# Patient Record
Sex: FEMALE | Race: WHITE | NOT HISPANIC OR LATINO | Employment: UNEMPLOYED | URBAN - METROPOLITAN AREA
[De-identification: names, ages, dates, MRNs, and addresses within clinical notes are randomized per-mention and may not be internally consistent; named-entity substitution may affect disease eponyms.]

---

## 2017-05-16 ENCOUNTER — CONVERSION ENCOUNTER (OUTPATIENT)
Dept: GENERAL RADIOLOGY | Facility: HOSPITAL | Age: 52
End: 2017-05-16

## 2017-05-24 ENCOUNTER — CONVERSION ENCOUNTER (OUTPATIENT)
Dept: GENERAL RADIOLOGY | Facility: HOSPITAL | Age: 52
End: 2017-05-24

## 2018-08-28 ENCOUNTER — CONVERSION ENCOUNTER (OUTPATIENT)
Dept: GENERAL RADIOLOGY | Facility: HOSPITAL | Age: 53
End: 2018-08-28

## 2019-08-30 ENCOUNTER — HOSPITAL ENCOUNTER (OUTPATIENT)
Dept: GENERAL RADIOLOGY | Facility: HOSPITAL | Age: 54
Discharge: HOME | End: 2019-08-30
Attending: OBSTETRICS & GYNECOLOGY

## 2019-12-26 ENCOUNTER — OFFICE VISIT CONVERTED (OUTPATIENT)
Dept: ORTHOPEDIC SURGERY | Facility: CLINIC | Age: 54
End: 2019-12-26
Attending: PHYSICIAN ASSISTANT

## 2020-01-16 ENCOUNTER — OFFICE VISIT CONVERTED (OUTPATIENT)
Dept: ORTHOPEDIC SURGERY | Facility: CLINIC | Age: 55
End: 2020-01-16
Attending: PHYSICIAN ASSISTANT

## 2020-01-16 ENCOUNTER — CONVERSION ENCOUNTER (OUTPATIENT)
Dept: ORTHOPEDIC SURGERY | Facility: CLINIC | Age: 55
End: 2020-01-16

## 2020-02-06 ENCOUNTER — CONVERSION ENCOUNTER (OUTPATIENT)
Dept: ORTHOPEDIC SURGERY | Facility: CLINIC | Age: 55
End: 2020-02-06

## 2020-02-06 ENCOUNTER — OFFICE VISIT CONVERTED (OUTPATIENT)
Dept: ORTHOPEDIC SURGERY | Facility: CLINIC | Age: 55
End: 2020-02-06
Attending: PHYSICIAN ASSISTANT

## 2020-02-12 ENCOUNTER — HOSPITAL ENCOUNTER (OUTPATIENT)
Dept: PHYSICAL THERAPY | Facility: CLINIC | Age: 55
Setting detail: RECURRING SERIES
Discharge: HOME | End: 2020-03-30
Attending: ORTHOPAEDIC SURGERY

## 2020-02-27 ENCOUNTER — OFFICE VISIT CONVERTED (OUTPATIENT)
Dept: ORTHOPEDIC SURGERY | Facility: CLINIC | Age: 55
End: 2020-02-27
Attending: PHYSICIAN ASSISTANT

## 2020-02-27 ENCOUNTER — CONVERSION ENCOUNTER (OUTPATIENT)
Dept: ORTHOPEDIC SURGERY | Facility: CLINIC | Age: 55
End: 2020-02-27

## 2020-05-08 ENCOUNTER — HOSPITAL ENCOUNTER (OUTPATIENT)
Dept: LAB | Facility: HOSPITAL | Age: 55
Discharge: HOME | End: 2020-05-08
Attending: INTERNAL MEDICINE

## 2020-05-08 LAB
25(OH)D3 SERPL-MCNC: 41.6 NG/ML (ref 30–100)
ALBUMIN SERPL-MCNC: 4.6 G/DL (ref 3.5–5)
ALBUMIN/GLOB SERPL: 1.6 {RATIO} (ref 1.4–2.6)
ALP SERPL-CCNC: 56 U/L (ref 53–141)
ALT SERPL-CCNC: 18 U/L (ref 10–40)
AMPHETAMINES UR QL SCN: POSITIVE
ANION GAP SERPL CALC-SCNC: 21 MMOL/L (ref 8–19)
AST SERPL-CCNC: 21 U/L (ref 15–50)
BARBITURATES UR QL SCN: NEGATIVE
BASOPHILS # BLD AUTO: 0.05 10*3/UL (ref 0–0.2)
BASOPHILS NFR BLD AUTO: 1 % (ref 0–3)
BENZODIAZ UR QL SCN: NEGATIVE
BILIRUB SERPL-MCNC: 0.45 MG/DL (ref 0.2–1.3)
BUN SERPL-MCNC: 11 MG/DL (ref 5–25)
BUN/CREAT SERPL: 14 {RATIO} (ref 6–20)
CALCIUM SERPL-MCNC: 9.7 MG/DL (ref 8.7–10.4)
CHLORIDE SERPL-SCNC: 99 MMOL/L (ref 99–111)
CONV ABS IMM GRAN: 0.01 10*3/UL (ref 0–0.2)
CONV CO2: 22 MMOL/L (ref 22–32)
CONV COCAINE, UR: NEGATIVE
CONV IMMATURE GRAN: 0.2 % (ref 0–1.8)
CONV TOTAL PROTEIN: 7.5 G/DL (ref 6.3–8.2)
CREAT UR-MCNC: 0.78 MG/DL (ref 0.5–0.9)
DEPRECATED RDW RBC AUTO: 42.2 FL (ref 36.4–46.3)
EOSINOPHIL # BLD AUTO: 0.1 10*3/UL (ref 0–0.7)
EOSINOPHIL # BLD AUTO: 2 % (ref 0–7)
ERYTHROCYTE [DISTWIDTH] IN BLOOD BY AUTOMATED COUNT: 12.7 % (ref 11.7–14.4)
FOLATE SERPL-MCNC: 18 NG/ML (ref 4.8–20)
GFR SERPLBLD BASED ON 1.73 SQ M-ARVRAT: >60 ML/MIN/{1.73_M2}
GLOBULIN UR ELPH-MCNC: 2.9 G/DL (ref 2–3.5)
GLUCOSE SERPL-MCNC: 93 MG/DL (ref 65–99)
HCT VFR BLD AUTO: 39.2 % (ref 37–47)
HGB BLD-MCNC: 12.8 G/DL (ref 12–16)
LYMPHOCYTES # BLD AUTO: 1.37 10*3/UL (ref 1–5)
LYMPHOCYTES NFR BLD AUTO: 27.7 % (ref 20–45)
MCH RBC QN AUTO: 29.5 PG (ref 27–31)
MCHC RBC AUTO-ENTMCNC: 32.7 G/DL (ref 33–37)
MCV RBC AUTO: 90.3 FL (ref 81–99)
METHADONE UR QL SCN: NEGATIVE
MONOCYTES # BLD AUTO: 0.55 10*3/UL (ref 0.2–1.2)
MONOCYTES NFR BLD AUTO: 11.1 % (ref 3–10)
NEUTROPHILS # BLD AUTO: 2.86 10*3/UL (ref 2–8)
NEUTROPHILS NFR BLD AUTO: 58 % (ref 30–85)
NRBC CBCN: 0 % (ref 0–0.7)
OPIATES TESTED UR SCN: NEGATIVE
OSMOLALITY SERPL CALC.SUM OF ELEC: 285 MOSM/KG (ref 273–304)
OXYCODONE UR QL SCN: NEGATIVE
PCP UR QL: NEGATIVE
PLATELET # BLD AUTO: 335 10*3/UL (ref 130–400)
PMV BLD AUTO: 11.4 FL (ref 9.4–12.3)
POTASSIUM SERPL-SCNC: 4.2 MMOL/L (ref 3.5–5.3)
RBC # BLD AUTO: 4.34 10*6/UL (ref 4.2–5.4)
SODIUM SERPL-SCNC: 138 MMOL/L (ref 135–147)
THC SERPLBLD CFM-MCNC: NEGATIVE NG/ML
VIT B12 SERPL-MCNC: 550 PG/ML (ref 211–911)
WBC # BLD AUTO: 4.94 10*3/UL (ref 4.8–10.8)

## 2020-10-06 ENCOUNTER — HOSPITAL ENCOUNTER (OUTPATIENT)
Dept: MAMMOGRAPHY | Facility: HOSPITAL | Age: 55
Discharge: HOME | End: 2020-10-06
Attending: OBSTETRICS & GYNECOLOGY

## 2020-12-28 ENCOUNTER — HOSPITAL ENCOUNTER (OUTPATIENT)
Dept: LAB | Facility: HOSPITAL | Age: 55
Discharge: HOME | End: 2020-12-28
Attending: INTERNAL MEDICINE

## 2020-12-28 LAB
APPEARANCE UR: CLEAR
BILIRUB UR QL: NEGATIVE
COLOR UR: YELLOW
CONV COLLECTION SOURCE (UA): NORMAL
CONV UROBILINOGEN IN URINE BY AUTOMATED TEST STRIP: 0.2 {EHRLICHU}/DL (ref 0.1–1)
GLUCOSE UR QL: NEGATIVE MG/DL
HGB UR QL STRIP: NEGATIVE
KETONES UR QL STRIP: NEGATIVE MG/DL
LEUKOCYTE ESTERASE UR QL STRIP: NEGATIVE
NITRITE UR QL STRIP: NEGATIVE
PH UR STRIP.AUTO: 5.5 [PH] (ref 5–8)
PROT UR QL: NEGATIVE MG/DL
SP GR UR: 1.01 (ref 1–1.03)

## 2020-12-31 LAB
AMPICILLIN SUSC ISLT: <=2
AMPICILLIN+SULBAC SUSC ISLT: <=2
BACTERIA UR CULT: ABNORMAL
CEFAZOLIN SUSC ISLT: <=4
CEFEPIME SUSC ISLT: <=0.12
CEFTAZIDIME SUSC ISLT: <=1
CEFTRIAXONE SUSC ISLT: <=0.25
CIPROFLOXACIN SUSC ISLT: <=0.25
ERTAPENEM SUSC ISLT: <=0.12
GENTAMICIN SUSC ISLT: <=1
LEVOFLOXACIN SUSC ISLT: <=0.12
NITROFURANTOIN SUSC ISLT: <=16
PIP+TAZO SUSC ISLT: <=4
TMP SMX SUSC ISLT: <=20
TOBRAMYCIN SUSC ISLT: <=1

## 2021-02-04 ENCOUNTER — HOSPITAL ENCOUNTER (OUTPATIENT)
Dept: VACCINE CLINIC | Facility: HOSPITAL | Age: 56
Discharge: HOME | End: 2021-02-04
Attending: INTERNAL MEDICINE

## 2021-03-04 ENCOUNTER — HOSPITAL ENCOUNTER (OUTPATIENT)
Dept: VACCINE CLINIC | Facility: HOSPITAL | Age: 56
Discharge: HOME | End: 2021-03-04
Attending: INTERNAL MEDICINE

## 2021-04-08 ENCOUNTER — OFFICE VISIT CONVERTED (OUTPATIENT)
Dept: ORTHOPEDIC SURGERY | Facility: CLINIC | Age: 56
End: 2021-04-08
Attending: ORTHOPAEDIC SURGERY

## 2021-05-03 ENCOUNTER — HOSPITAL ENCOUNTER (OUTPATIENT)
Dept: LAB | Facility: HOSPITAL | Age: 56
Discharge: HOME | End: 2021-05-03
Attending: INTERNAL MEDICINE

## 2021-05-03 LAB
25(OH)D3 SERPL-MCNC: 38.2 NG/ML (ref 30–100)
ALBUMIN SERPL-MCNC: 4 G/DL (ref 3.5–5)
ALBUMIN/GLOB SERPL: 1.1 {RATIO} (ref 1.4–2.6)
ALP SERPL-CCNC: 34 U/L (ref 53–141)
ALT SERPL-CCNC: 13 U/L (ref 10–40)
ANION GAP SERPL CALC-SCNC: 15 MMOL/L (ref 8–19)
AST SERPL-CCNC: 15 U/L (ref 15–50)
BASOPHILS # BLD AUTO: 0.07 10*3/UL (ref 0–0.2)
BASOPHILS NFR BLD AUTO: 1.3 % (ref 0–3)
BILIRUB SERPL-MCNC: 0.27 MG/DL (ref 0.2–1.3)
BUN SERPL-MCNC: 9 MG/DL (ref 5–25)
BUN/CREAT SERPL: 10 {RATIO} (ref 6–20)
CALCIUM SERPL-MCNC: 9.7 MG/DL (ref 8.7–10.4)
CHLORIDE SERPL-SCNC: 105 MMOL/L (ref 99–111)
CHOLEST SERPL-MCNC: 157 MG/DL (ref 107–200)
CHOLEST/HDLC SERPL: 3.4 {RATIO} (ref 3–6)
CONV ABS IMM GRAN: 0.01 10*3/UL (ref 0–0.2)
CONV CO2: 23 MMOL/L (ref 22–32)
CONV IMMATURE GRAN: 0.2 % (ref 0–1.8)
CONV TOTAL PROTEIN: 7.5 G/DL (ref 6.3–8.2)
CREAT UR-MCNC: 0.86 MG/DL (ref 0.5–0.9)
DEPRECATED RDW RBC AUTO: 41 FL (ref 36.4–46.3)
EOSINOPHIL # BLD AUTO: 0.15 10*3/UL (ref 0–0.7)
EOSINOPHIL # BLD AUTO: 2.8 % (ref 0–7)
ERYTHROCYTE [DISTWIDTH] IN BLOOD BY AUTOMATED COUNT: 12.5 % (ref 11.7–14.4)
ERYTHROCYTE [SEDIMENTATION RATE] IN BLOOD: 60 MM/H (ref 0–30)
FOLATE SERPL-MCNC: 17.4 NG/ML (ref 4.8–20)
GFR SERPLBLD BASED ON 1.73 SQ M-ARVRAT: >60 ML/MIN/{1.73_M2}
GLOBULIN UR ELPH-MCNC: 3.5 G/DL (ref 2–3.5)
GLUCOSE SERPL-MCNC: 88 MG/DL (ref 65–99)
HCT VFR BLD AUTO: 36.2 % (ref 37–47)
HDLC SERPL-MCNC: 46 MG/DL (ref 40–60)
HGB BLD-MCNC: 12.1 G/DL (ref 12–16)
IRON SERPL-MCNC: 56 UG/DL (ref 60–170)
LDLC SERPL CALC-MCNC: 95 MG/DL (ref 70–100)
LYMPHOCYTES # BLD AUTO: 1.36 10*3/UL (ref 1–5)
LYMPHOCYTES NFR BLD AUTO: 25.3 % (ref 20–45)
MCH RBC QN AUTO: 29.8 PG (ref 27–31)
MCHC RBC AUTO-ENTMCNC: 33.4 G/DL (ref 33–37)
MCV RBC AUTO: 89.2 FL (ref 81–99)
MONOCYTES # BLD AUTO: 0.86 10*3/UL (ref 0.2–1.2)
MONOCYTES NFR BLD AUTO: 16 % (ref 3–10)
NEUTROPHILS # BLD AUTO: 2.92 10*3/UL (ref 2–8)
NEUTROPHILS NFR BLD AUTO: 54.4 % (ref 30–85)
NRBC CBCN: 0 % (ref 0–0.7)
OSMOLALITY SERPL CALC.SUM OF ELEC: 286 MOSM/KG (ref 273–304)
PLATELET # BLD AUTO: 321 10*3/UL (ref 130–400)
PMV BLD AUTO: 11.2 FL (ref 9.4–12.3)
POTASSIUM SERPL-SCNC: 4.2 MMOL/L (ref 3.5–5.3)
RBC # BLD AUTO: 4.06 10*6/UL (ref 4.2–5.4)
SODIUM SERPL-SCNC: 139 MMOL/L (ref 135–147)
TRIGL SERPL-MCNC: 80 MG/DL (ref 40–150)
TSH SERPL-ACNC: 1.37 M[IU]/L (ref 0.27–4.2)
VIT B12 SERPL-MCNC: 272 PG/ML (ref 211–911)
VLDLC SERPL-MCNC: 16 MG/DL (ref 5–37)
WBC # BLD AUTO: 5.37 10*3/UL (ref 4.8–10.8)

## 2021-05-14 VITALS — HEART RATE: 79 BPM | WEIGHT: 177 LBS | OXYGEN SATURATION: 97 % | HEIGHT: 62 IN | BODY MASS INDEX: 32.57 KG/M2

## 2021-05-14 NOTE — PROGRESS NOTES
Progress Note      Patient Name: Margret Yeager   Patient ID: [id number]   Sex: Female   YOB: 1965    Primary Care Provider: Juliocesar Farley MD   Referring Provider: Juliocesar Farley MD    Visit Date: 2021    Provider: Delmy Hartmann MD   Location: [facility]   Location Address: [address] [place] [address]  [id number]   Location Phone: [phone]          Chief Complaint  · right knee pain      History Of Present Illness  Margret Yeager is a 55 year old /White female who presents today to [facility].      Patient presents today for an evaluation of right knee pain. She has a history of ORIF medial femoral condyle fracture 19 by Dr. Hartmann. She states she feels like she has no control over her right knee/leg. She states she could be walking straight on flat ground and she falls. She states pain along the medial aspect of her knee with ambulation. She denies any trauma or injury to her right knee. She states that she never finished her course of therapy after her ORIF because of COVID closing therapy down.       Past Medical History  ***No Significant Medical History         Past Surgical History  Appendectomy;  delivery only; Cesarian Section; Cholecstectomy; Colonoscopy; Gallbladder; Hysterectomy; Joint Surgery; Shoulder surgery         Allergy List  * Other; PENICILLINS; SULFA (SULFONAMIDES)       Allergies Reconciled  Family Medical History  Esophagus Neoplasm, Malignant; Cervical Neoplasm, Malignant; Stroke; Heart Disease; Colon Cancer; Osteoporosis; Family history of Arthritis         Social History  Alcohol (Light); Alcohol Use (Current some day); Claustophobic (Unknown); Homemaker.; lives with children; lives with spouse; .; Recreational Drug Use (Never); Tobacco (Never); Unemployed.; Working         Review of Systems  · Constitutional  o Denies  o : fever, chills, weight loss  · Cardiovascular  o Denies  o : chest  "pain, shortness of breath  · Gastrointestinal  o Denies  o : liver disease, heartburn, nausea, blood in stools  · Genitourinary  o Denies  o : painful urination, blood in urine  · Integument  o Denies  o : rash, itching  · Neurologic  o Denies  o : headache, weakness, loss of consciousness  · Musculoskeletal  o Denies  o : painful, swollen joints  · Psychiatric  o Denies  o : drug/alcohol addiction, anxiety, depression      Vitals  Date Time BP Position Site L\R Cuff Size HR RR TEMP (F) WT  HT  BMI kg/m2 BSA m2 O2 Sat FR L/min FiO2 HC       04/08/2021 02:25 PM      79 - R   176lbs 16oz 5'  2\" 32.37 1.87 97 %            Physical Examination  · Constitutional  o Appearance  o : well developed, well-nourished, no obvious deformities present  · Head and Face  o Head  o :   § Inspection  § : normocephalic  o Face  o :   § Inspection  § : no facial lesions  · Eyes  o Conjunctivae  o : conjunctivae normal  o Sclerae  o : sclerae white  · Ears, Nose, Mouth and Throat  o Ears  o :   § External Ears  § : appearance within normal limits  § Hearing  § : intact  o Nose  o :   § External Nose  § : appearance normal  · Neck  o Inspection/Palpation  o : normal appearance  o Range of Motion  o : full range of motion  · Respiratory  o Respiratory Effort  o : breathing unlabored  o Inspection of Chest  o : normal appearance  o Auscultation of Lungs  o : no audible wheezing or rales  · Cardiovascular  o Heart  o : regular rate  · Gastrointestinal  o Abdominal Examination  o : soft and non-tender  · Skin and Subcutaneous Tissue  o General Inspection  o : intact, no rashes  · Psychiatric  o General  o : Alert and oriented x3  o Judgement and Insight  o : judgment and insight intact  o Mood and Affect  o : mood normal, affect appropriate  · Right Knee  o Inspection  o : Sensation grossly intact. Neurovascular intact. Calf supple, non-tender. Tender medial joint line about the screws. Full flexion and extension. Skin intact. Well healed " scars. Full weight bearing. Limping gait. Negative Lachman. Negative posterior sag. Stable to valgus/varus stress. Good strength in quadriceps, hamstrings, dorsiflexors, and plantar flexors. No swelling, skin discoloration or atrophy.   · In Office Procedures  o View  o : LAT/SUNRISE/STANDING   o Site  o : right, knee  o Indication  o : Right knee pain  o Study  o : X-rays ordered, taken in the office, and reviewed today.  o Xray  o : Intact screws with no signs of wearing or loosening. No signs of fracture or dislocation.           Assessment  · Right knee pain, unspecified chronicity     719.46/M25.561  · History of ORIF medial femoral condyle fracture     V45.89/Z98.890  · Knee instability, right     718.86/M25.361      Plan  · Orders  o Knee (Right) [facility] ([id number]) - 719.46/M25.561 - 04/08/2021  · Medications  o Medications have been Reconciled  o Transition of Care or Provider Policy  · Instructions  o Dr. Hartmann saw and examined the patient and agrees with plan.   o X-rays reviewed by Dr. Hartmann.  o Reviewed the patient's Past Medical, Social, and Family history as well as the ROS at today's visit, no changes.  o Call or return if worsening symptoms.  o Follow Up PRN.  o Discussed treatment plans and diagnosis with the patient. Discussed removing hardware, therapy, and MRI. Patient opted for physical therapy and she was given a prescription today for this. If she wishes to have an MRI she will give us a call.   o The above service was scribed by Hoda Lazar on my behalf and I attest to the accuracy of the note. mc             Electronically Signed by: Hoda Lazar-, Other -Author on April 9, 2021 08:05:22 AM  Electronically Co-signed by: Delmy Hartmann MD -Reviewer on April 11, 2021 11:10:57 AM

## 2021-05-15 VITALS — BODY MASS INDEX: 34.04 KG/M2 | WEIGHT: 185 LBS | HEART RATE: 85 BPM | HEIGHT: 62 IN | OXYGEN SATURATION: 98 %

## 2021-05-15 VITALS — HEART RATE: 93 BPM | OXYGEN SATURATION: 98 % | HEIGHT: 62 IN | BODY MASS INDEX: 34.04 KG/M2 | WEIGHT: 185 LBS

## 2021-05-15 VITALS — OXYGEN SATURATION: 98 % | HEART RATE: 85 BPM | BODY MASS INDEX: 33.13 KG/M2 | HEIGHT: 62 IN | WEIGHT: 180 LBS

## 2021-05-15 VITALS — WEIGHT: 185 LBS | BODY MASS INDEX: 34.04 KG/M2 | HEART RATE: 90 BPM | OXYGEN SATURATION: 98 % | HEIGHT: 62 IN

## 2021-06-08 ENCOUNTER — APPOINTMENT (OUTPATIENT)
Dept: WOMENS IMAGING | Facility: HOSPITAL | Age: 56
End: 2021-06-08

## 2021-06-08 PROCEDURE — 77063 BREAST TOMOSYNTHESIS BI: CPT | Performed by: RADIOLOGY

## 2021-06-08 PROCEDURE — 77067 SCR MAMMO BI INCL CAD: CPT | Performed by: RADIOLOGY

## 2021-07-20 RX ORDER — BUPROPION HYDROCHLORIDE 300 MG/1
300 TABLET ORAL DAILY
Qty: 90 TABLET | Refills: 3 | Status: SHIPPED | OUTPATIENT
Start: 2021-07-20 | End: 2023-03-30 | Stop reason: SDUPTHER

## 2021-07-20 NOTE — TELEPHONE ENCOUNTER
Call patient tell her we will send in a prescription for the Wellbutrin 300 mg daily    Asked patient what pharmacy she uses and please put into the system so I can send this prescription

## 2021-07-20 NOTE — TELEPHONE ENCOUNTER
Patient called [phone]) and asked if you could increase her Wellbutrin XL? She takes 150 mgs qd and feels like she needs a little more. Uses [facility].

## 2021-08-24 RX ORDER — SOD SULF/POT CHLORIDE/MAG SULF 1.479 G
1 TABLET ORAL TAKE AS DIRECTED
Qty: 24 TABLET | Refills: 0 | Status: SHIPPED | OUTPATIENT
Start: 2021-08-24 | End: 2021-08-31 | Stop reason: HOSPADM

## 2021-08-31 ENCOUNTER — TELEPHONE (OUTPATIENT)
Dept: INTERNAL MEDICINE | Facility: CLINIC | Age: 56
End: 2021-08-31

## 2021-08-31 NOTE — TELEPHONE ENCOUNTER
Please call the patient, tell her I got a message from her this past weekend and not sure really what I need to do, if I need to do a telehealth visit with her or see her in the office to review things I can do that next week, just let me know what she needs me to do

## 2021-08-31 NOTE — TELEPHONE ENCOUNTER
Dr. Rowe, pt was scheduled for a colonoscreening for tomorrow, but she cancelled it due she would have to pay 2500.00 out of pocket, it has only been 4 yrs and pt's insurance will only pay for it every 5 years. She is wanting to know if she can just repeat her blood work in November and see where her anemic level is and go from there. Please advise.

## 2021-10-18 ENCOUNTER — TRANSCRIBE ORDERS (OUTPATIENT)
Dept: LAB | Facility: HOSPITAL | Age: 56
End: 2021-10-18

## 2021-10-18 ENCOUNTER — LAB (OUTPATIENT)
Dept: LAB | Facility: HOSPITAL | Age: 56
End: 2021-10-18

## 2021-10-18 DIAGNOSIS — D64.9 ANEMIA, UNSPECIFIED TYPE: ICD-10-CM

## 2021-10-18 DIAGNOSIS — M25.50 PAIN IN JOINT, MULTIPLE SITES: ICD-10-CM

## 2021-10-18 DIAGNOSIS — E53.8 VITAMIN B12 DEFICIENCY: ICD-10-CM

## 2021-10-18 DIAGNOSIS — M25.50 PAIN IN JOINT, MULTIPLE SITES: Primary | ICD-10-CM

## 2021-10-18 LAB
BASOPHILS # BLD AUTO: 0.07 10*3/MM3 (ref 0–0.2)
BASOPHILS NFR BLD AUTO: 1.4 % (ref 0–1.5)
DEPRECATED RDW RBC AUTO: 42.6 FL (ref 37–54)
EOSINOPHIL # BLD AUTO: 0.21 10*3/MM3 (ref 0–0.4)
EOSINOPHIL NFR BLD AUTO: 4.2 % (ref 0.3–6.2)
ERYTHROCYTE [DISTWIDTH] IN BLOOD BY AUTOMATED COUNT: 12.8 % (ref 12.3–15.4)
ERYTHROCYTE [SEDIMENTATION RATE] IN BLOOD: 6 MM/HR (ref 0–30)
HCT VFR BLD AUTO: 40 % (ref 34–46.6)
HGB BLD-MCNC: 13.4 G/DL (ref 12–15.9)
IMM GRANULOCYTES # BLD AUTO: 0.02 10*3/MM3 (ref 0–0.05)
IMM GRANULOCYTES NFR BLD AUTO: 0.4 % (ref 0–0.5)
LYMPHOCYTES # BLD AUTO: 1.31 10*3/MM3 (ref 0.7–3.1)
LYMPHOCYTES NFR BLD AUTO: 25.9 % (ref 19.6–45.3)
MCH RBC QN AUTO: 30.6 PG (ref 26.6–33)
MCHC RBC AUTO-ENTMCNC: 33.5 G/DL (ref 31.5–35.7)
MCV RBC AUTO: 91.3 FL (ref 79–97)
MONOCYTES # BLD AUTO: 0.48 10*3/MM3 (ref 0.1–0.9)
MONOCYTES NFR BLD AUTO: 9.5 % (ref 5–12)
NEUTROPHILS NFR BLD AUTO: 2.97 10*3/MM3 (ref 1.7–7)
NEUTROPHILS NFR BLD AUTO: 58.6 % (ref 42.7–76)
NRBC BLD AUTO-RTO: 0 /100 WBC (ref 0–0.2)
PLATELET # BLD AUTO: 345 10*3/MM3 (ref 140–450)
PMV BLD AUTO: 11.2 FL (ref 6–12)
RBC # BLD AUTO: 4.38 10*6/MM3 (ref 3.77–5.28)
WBC # BLD AUTO: 5.06 10*3/MM3 (ref 3.4–10.8)

## 2021-10-18 PROCEDURE — 85652 RBC SED RATE AUTOMATED: CPT

## 2021-10-18 PROCEDURE — 85025 COMPLETE CBC W/AUTO DIFF WBC: CPT

## 2021-10-18 PROCEDURE — 80053 COMPREHEN METABOLIC PANEL: CPT

## 2021-10-18 PROCEDURE — 84466 ASSAY OF TRANSFERRIN: CPT

## 2021-10-18 PROCEDURE — 83540 ASSAY OF IRON: CPT

## 2021-10-18 PROCEDURE — 36415 COLL VENOUS BLD VENIPUNCTURE: CPT

## 2021-10-19 LAB
ALBUMIN SERPL-MCNC: 4.8 G/DL (ref 3.5–5.2)
ALBUMIN/GLOB SERPL: 2 G/DL
ALP SERPL-CCNC: 38 U/L (ref 39–117)
ALT SERPL W P-5'-P-CCNC: 21 U/L (ref 1–33)
ANION GAP SERPL CALCULATED.3IONS-SCNC: 13.1 MMOL/L (ref 5–15)
AST SERPL-CCNC: 18 U/L (ref 1–32)
BILIRUB SERPL-MCNC: 0.2 MG/DL (ref 0–1.2)
BUN SERPL-MCNC: 19 MG/DL (ref 6–20)
BUN/CREAT SERPL: 21.3 (ref 7–25)
CALCIUM SPEC-SCNC: 9.8 MG/DL (ref 8.6–10.5)
CHLORIDE SERPL-SCNC: 103 MMOL/L (ref 98–107)
CO2 SERPL-SCNC: 24.9 MMOL/L (ref 22–29)
CREAT SERPL-MCNC: 0.89 MG/DL (ref 0.57–1)
GFR SERPL CREATININE-BSD FRML MDRD: 66 ML/MIN/1.73
GLOBULIN UR ELPH-MCNC: 2.4 GM/DL
GLUCOSE SERPL-MCNC: 72 MG/DL (ref 65–99)
IRON 24H UR-MRATE: 94 MCG/DL (ref 37–145)
IRON SATN MFR SERPL: 22 % (ref 20–50)
POTASSIUM SERPL-SCNC: 4.6 MMOL/L (ref 3.5–5.2)
PROT SERPL-MCNC: 7.2 G/DL (ref 6–8.5)
SODIUM SERPL-SCNC: 141 MMOL/L (ref 136–145)
TIBC SERPL-MCNC: 419 MCG/DL (ref 298–536)
TRANSFERRIN SERPL-MCNC: 281 MG/DL (ref 200–360)

## 2022-05-02 ENCOUNTER — TRANSCRIBE ORDERS (OUTPATIENT)
Dept: LAB | Facility: HOSPITAL | Age: 57
End: 2022-05-02

## 2022-05-02 ENCOUNTER — LAB (OUTPATIENT)
Dept: LAB | Facility: HOSPITAL | Age: 57
End: 2022-05-02

## 2022-05-02 DIAGNOSIS — Z01.818 PRE-OP TESTING: ICD-10-CM

## 2022-05-02 DIAGNOSIS — Z01.818 PRE-OP TESTING: Primary | ICD-10-CM

## 2022-05-02 LAB — SARS-COV-2 RNA PNL SPEC NAA+PROBE: DETECTED

## 2022-05-02 PROCEDURE — U0004 COV-19 TEST NON-CDC HGH THRU: HCPCS

## 2022-05-31 ENCOUNTER — TELEPHONE (OUTPATIENT)
Dept: INTERNAL MEDICINE | Facility: CLINIC | Age: 57
End: 2022-05-31

## 2022-05-31 RX ORDER — VALACYCLOVIR HYDROCHLORIDE 500 MG/1
500 TABLET, FILM COATED ORAL 2 TIMES DAILY
Qty: 60 TABLET | Refills: 2 | Status: SHIPPED | OUTPATIENT
Start: 2022-05-31 | End: 2023-03-30

## 2022-05-31 NOTE — TELEPHONE ENCOUNTER
Call patient tell her we will send in a prescription for Valtrex 500 twice a day as needed for cold sores #60 with 2 refills

## 2022-05-31 NOTE — TELEPHONE ENCOUNTER
----- Message from Ben Graves MA sent at 5/31/2022  1:07 PM EDT -----  Regarding: FW: Valtrex request      ----- Message -----  From: Margret Yeager  Sent: 5/31/2022  11:43 AM EDT  To: [facility] [place] [facility] Clinical Pool  Subject: Valtrex request                                  We are leaving for [place] on June 10th. I wanted to request a prescription for Valtrex for fever blisters since I always seem to get them now.   Thank you!!!  Margret

## 2022-10-06 ENCOUNTER — APPOINTMENT (OUTPATIENT)
Dept: WOMENS IMAGING | Facility: HOSPITAL | Age: 57
End: 2022-10-06

## 2022-10-06 PROCEDURE — 77063 BREAST TOMOSYNTHESIS BI: CPT | Performed by: RADIOLOGY

## 2022-10-06 PROCEDURE — 77067 SCR MAMMO BI INCL CAD: CPT | Performed by: RADIOLOGY

## 2023-03-30 ENCOUNTER — OFFICE VISIT (OUTPATIENT)
Dept: INTERNAL MEDICINE | Facility: CLINIC | Age: 58
End: 2023-03-30
Payer: COMMERCIAL

## 2023-03-30 VITALS
DIASTOLIC BLOOD PRESSURE: 85 MMHG | BODY MASS INDEX: 34.74 KG/M2 | OXYGEN SATURATION: 97 % | HEART RATE: 87 BPM | SYSTOLIC BLOOD PRESSURE: 146 MMHG | WEIGHT: 188.8 LBS | HEIGHT: 62 IN

## 2023-03-30 DIAGNOSIS — Z71.3 WEIGHT LOSS COUNSELING, ENCOUNTER FOR: ICD-10-CM

## 2023-03-30 DIAGNOSIS — Z00.00 PHYSICAL EXAM, ANNUAL: ICD-10-CM

## 2023-03-30 DIAGNOSIS — R63.5 EXCESSIVE WEIGHT GAIN: ICD-10-CM

## 2023-03-30 DIAGNOSIS — F33.1 MAJOR DEPRESSIVE DISORDER, RECURRENT, MODERATE: ICD-10-CM

## 2023-03-30 DIAGNOSIS — F41.1 ANXIETY, GENERALIZED: Primary | ICD-10-CM

## 2023-03-30 RX ORDER — PANTOPRAZOLE SODIUM 40 MG/1
40 TABLET, DELAYED RELEASE ORAL DAILY
Qty: 90 TABLET | Refills: 3 | Status: SHIPPED | OUTPATIENT
Start: 2023-03-30

## 2023-03-30 RX ORDER — BUPROPION HYDROCHLORIDE 300 MG/1
300 TABLET ORAL DAILY
Qty: 90 TABLET | Refills: 3 | Status: SHIPPED | OUTPATIENT
Start: 2023-03-30

## 2023-03-30 NOTE — PROGRESS NOTES
"Answers for HPI/ROS submitted by the patient on 3/29/2023  Please describe your symptoms.: Routine yearly bloodwork/checkup  Have you had these symptoms before?: No  How long have you been having these symptoms?: Greater than 2 weeks  What is the primary reason for your visit?: Other    CHIEF COMPLAINT/ HPI:  Annual Exam (Yearly )    F/u with depression/ meds     Concerns re wgt and wegovy and wgt loss problems     wgt going up,     Preventative measures discussed, patient does not smoke does not drink much she wears her seatbelt we encouraged more aerobic activity but she is very active on a daily basis with work,      Objective   Vital Signs  Vitals:    03/30/23 1216   BP: 146/85   Pulse: 87   SpO2: 97%   Weight: 85.6 kg (188 lb 12.8 oz)   Height: 157.5 cm (62.01\")      Body mass index is 34.52 kg/m².  Review of Systems   Constitutional: Negative.    HENT: Negative.    Eyes: Negative.    Respiratory: Negative.    Cardiovascular: Negative.    Gastrointestinal: Negative.    Endocrine: Negative.    Genitourinary: Negative.    Musculoskeletal: Negative.    Allergic/Immunologic: Negative.    Neurological: Negative.    Hematological: Negative.    Psychiatric/Behavioral: Negative.       Physical Exam  Constitutional:       General: She is not in acute distress.     Appearance: Normal appearance.   HENT:      Head: Normocephalic.      Mouth/Throat:      Mouth: Mucous membranes are moist.   Eyes:      Conjunctiva/sclera: Conjunctivae normal.      Pupils: Pupils are equal, round, and reactive to light.   Cardiovascular:      Rate and Rhythm: Normal rate and regular rhythm.      Pulses: Normal pulses.      Heart sounds: Normal heart sounds.   Pulmonary:      Effort: Pulmonary effort is normal.      Breath sounds: Normal breath sounds.   Abdominal:      General: Abdomen is flat. Bowel sounds are normal.      Palpations: Abdomen is soft.   Musculoskeletal:         General: No swelling. Normal range of motion.      Cervical " back: Neck supple.   Skin:     General: Skin is warm and dry.      Coloration: Skin is not jaundiced.   Neurological:      General: No focal deficit present.      Mental Status: She is alert and oriented to person, place, and time. Mental status is at baseline.   Psychiatric:         Mood and Affect: Mood normal.         Behavior: Behavior normal.         Thought Content: Thought content normal.         Judgment: Judgment normal.        Result Review :   No results found for: PROBNP, BNP          Lab Results   Component Value Date    TSH 1.370 05/03/2021    TSH 0.368 12/10/2019      Lab Results   Component Value Date    FREET4 1.1 12/10/2019                          Visit Diagnoses:    ICD-10-CM ICD-9-CM   1. Anxiety, generalized  F41.1 300.02   2. Major depressive disorder, recurrent, moderate (HCC)  F33.1 296.32   3. Excessive weight gain  R63.5 783.1   4. Physical exam, annual  Z00.00 V70.0   5. Weight loss counseling, encounter for  Z71.3 V65.3       Assessment and Plan   Diagnoses and all orders for this visit:    1. Anxiety, generalized (Primary)  -     Hepatitis C antibody; Future  -     buPROPion XL (Wellbutrin XL) 300 MG 24 hr tablet; Take 1 tablet by mouth Daily.  Dispense: 90 tablet; Refill: 3  -     Semaglutide-Weight Management 0.25 MG/0.5ML solution auto-injector; Inject 0.25 mg under the skin into the appropriate area as directed 1 (One) Time Per Week.  Dispense: 2 mL; Refill: 5  -     Lipid Panel; Future  -     Comprehensive Metabolic Panel; Future  -     CBC & Differential; Future  -     TSH+Free T4; Future  -     Vitamin B12 anemia; Future  -     Folate anemia; Future  -     Vitamin D,25-Hydroxy; Future    2. Major depressive disorder, recurrent, moderate (HCC)  -     Hepatitis C antibody; Future  -     buPROPion XL (Wellbutrin XL) 300 MG 24 hr tablet; Take 1 tablet by mouth Daily.  Dispense: 90 tablet; Refill: 3  -     Semaglutide-Weight Management 0.25 MG/0.5ML solution auto-injector; Inject 0.25  mg under the skin into the appropriate area as directed 1 (One) Time Per Week.  Dispense: 2 mL; Refill: 5  -     Lipid Panel; Future  -     Comprehensive Metabolic Panel; Future  -     CBC & Differential; Future  -     TSH+Free T4; Future  -     Vitamin B12 anemia; Future  -     Folate anemia; Future  -     Vitamin D,25-Hydroxy; Future    3. Excessive weight gain  -     Hepatitis C antibody; Future  -     buPROPion XL (Wellbutrin XL) 300 MG 24 hr tablet; Take 1 tablet by mouth Daily.  Dispense: 90 tablet; Refill: 3  -     Semaglutide-Weight Management 0.25 MG/0.5ML solution auto-injector; Inject 0.25 mg under the skin into the appropriate area as directed 1 (One) Time Per Week.  Dispense: 2 mL; Refill: 5  -     Lipid Panel; Future  -     Comprehensive Metabolic Panel; Future  -     CBC & Differential; Future  -     TSH+Free T4; Future  -     Vitamin B12 anemia; Future  -     Folate anemia; Future  -     Vitamin D,25-Hydroxy; Future    4. Physical exam, annual  -     Hepatitis C antibody; Future  -     buPROPion XL (Wellbutrin XL) 300 MG 24 hr tablet; Take 1 tablet by mouth Daily.  Dispense: 90 tablet; Refill: 3  -     Semaglutide-Weight Management 0.25 MG/0.5ML solution auto-injector; Inject 0.25 mg under the skin into the appropriate area as directed 1 (One) Time Per Week.  Dispense: 2 mL; Refill: 5  -     Lipid Panel; Future  -     Comprehensive Metabolic Panel; Future  -     CBC & Differential; Future  -     TSH+Free T4; Future  -     Vitamin B12 anemia; Future  -     Folate anemia; Future  -     Vitamin D,25-Hydroxy; Future    5. Weight loss counseling, encounter for  -     Hepatitis C antibody; Future  -     buPROPion XL (Wellbutrin XL) 300 MG 24 hr tablet; Take 1 tablet by mouth Daily.  Dispense: 90 tablet; Refill: 3  -     Semaglutide-Weight Management 0.25 MG/0.5ML solution auto-injector; Inject 0.25 mg under the skin into the appropriate area as directed 1 (One) Time Per Week.  Dispense: 2 mL; Refill: 5  -      Lipid Panel; Future  -     Comprehensive Metabolic Panel; Future  -     CBC & Differential; Future  -     TSH+Free T4; Future  -     Vitamin B12 anemia; Future  -     Folate anemia; Future  -     Vitamin D,25-Hydroxy; Future    Other orders  -     pantoprazole (Protonix) 40 MG EC tablet; Take 1 tablet by mouth Daily.  Dispense: 90 tablet; Refill: 3          Weight loss counseling today, discussion with use of Wegovy, new prescription will be sent in today March 30, 2023    Mild anxiety and depression, will refill restart Wellbutrin  mg daily, has taken before    Elevated blood pressure, patient will monitor blood pressures on a regular basis may need medications, discussed March 30, 2023, will also get blood work today or this week,    Dyspepsia, globus sensation in her throat, possibly related to stress anxiety reflux disease, blood pressure, patient has upcoming ---c scope / egd April 2023,--       Follow Up   Return in about 3 months (around 6/30/2023).  Patient was given instructions and counseling regarding her condition or for health maintenance advice. Please see specific information pulled into the AVS if appropriate.

## 2023-03-31 ENCOUNTER — TELEPHONE (OUTPATIENT)
Dept: INTERNAL MEDICINE | Facility: CLINIC | Age: 58
End: 2023-03-31

## 2023-03-31 ENCOUNTER — LAB (OUTPATIENT)
Dept: LAB | Facility: HOSPITAL | Age: 58
End: 2023-03-31
Payer: COMMERCIAL

## 2023-03-31 DIAGNOSIS — Z71.3 WEIGHT LOSS COUNSELING, ENCOUNTER FOR: ICD-10-CM

## 2023-03-31 DIAGNOSIS — R63.5 EXCESSIVE WEIGHT GAIN: ICD-10-CM

## 2023-03-31 DIAGNOSIS — F33.1 MAJOR DEPRESSIVE DISORDER, RECURRENT, MODERATE: ICD-10-CM

## 2023-03-31 DIAGNOSIS — F41.1 ANXIETY, GENERALIZED: ICD-10-CM

## 2023-03-31 DIAGNOSIS — Z00.00 PHYSICAL EXAM, ANNUAL: ICD-10-CM

## 2023-03-31 LAB
25(OH)D3 SERPL-MCNC: 30.5 NG/ML (ref 30–100)
ALBUMIN SERPL-MCNC: 4.6 G/DL (ref 3.5–5.2)
ALBUMIN/GLOB SERPL: 1.8 G/DL
ALP SERPL-CCNC: 43 U/L (ref 39–117)
ALT SERPL W P-5'-P-CCNC: 19 U/L (ref 1–33)
ANION GAP SERPL CALCULATED.3IONS-SCNC: 11.2 MMOL/L (ref 5–15)
AST SERPL-CCNC: 20 U/L (ref 1–32)
BASOPHILS # BLD AUTO: 0.09 10*3/MM3 (ref 0–0.2)
BASOPHILS NFR BLD AUTO: 1.9 % (ref 0–1.5)
BILIRUB SERPL-MCNC: 0.3 MG/DL (ref 0–1.2)
BUN SERPL-MCNC: 18 MG/DL (ref 6–20)
BUN/CREAT SERPL: 22.2 (ref 7–25)
CALCIUM SPEC-SCNC: 9.9 MG/DL (ref 8.6–10.5)
CHLORIDE SERPL-SCNC: 105 MMOL/L (ref 98–107)
CHOLEST SERPL-MCNC: 189 MG/DL (ref 0–200)
CO2 SERPL-SCNC: 22.8 MMOL/L (ref 22–29)
CREAT SERPL-MCNC: 0.81 MG/DL (ref 0.57–1)
DEPRECATED RDW RBC AUTO: 43.5 FL (ref 37–54)
EGFRCR SERPLBLD CKD-EPI 2021: 84.8 ML/MIN/1.73
EOSINOPHIL # BLD AUTO: 0.2 10*3/MM3 (ref 0–0.4)
EOSINOPHIL NFR BLD AUTO: 4.3 % (ref 0.3–6.2)
ERYTHROCYTE [DISTWIDTH] IN BLOOD BY AUTOMATED COUNT: 13 % (ref 12.3–15.4)
GLOBULIN UR ELPH-MCNC: 2.6 GM/DL
GLUCOSE SERPL-MCNC: 91 MG/DL (ref 65–99)
HCT VFR BLD AUTO: 38.3 % (ref 34–46.6)
HCV AB SER DONR QL: NORMAL
HDLC SERPL-MCNC: 51 MG/DL (ref 40–60)
HGB BLD-MCNC: 12.5 G/DL (ref 12–15.9)
IMM GRANULOCYTES # BLD AUTO: 0.01 10*3/MM3 (ref 0–0.05)
IMM GRANULOCYTES NFR BLD AUTO: 0.2 % (ref 0–0.5)
LDLC SERPL CALC-MCNC: 127 MG/DL (ref 0–100)
LDLC/HDLC SERPL: 2.48 {RATIO}
LYMPHOCYTES # BLD AUTO: 1.78 10*3/MM3 (ref 0.7–3.1)
LYMPHOCYTES NFR BLD AUTO: 38.3 % (ref 19.6–45.3)
MCH RBC QN AUTO: 29.7 PG (ref 26.6–33)
MCHC RBC AUTO-ENTMCNC: 32.6 G/DL (ref 31.5–35.7)
MCV RBC AUTO: 91 FL (ref 79–97)
MONOCYTES # BLD AUTO: 0.54 10*3/MM3 (ref 0.1–0.9)
MONOCYTES NFR BLD AUTO: 11.6 % (ref 5–12)
NEUTROPHILS NFR BLD AUTO: 2.03 10*3/MM3 (ref 1.7–7)
NEUTROPHILS NFR BLD AUTO: 43.7 % (ref 42.7–76)
NRBC BLD AUTO-RTO: 0.2 /100 WBC (ref 0–0.2)
PLATELET # BLD AUTO: 366 10*3/MM3 (ref 140–450)
PMV BLD AUTO: 11.4 FL (ref 6–12)
POTASSIUM SERPL-SCNC: 4.2 MMOL/L (ref 3.5–5.2)
PROT SERPL-MCNC: 7.2 G/DL (ref 6–8.5)
RBC # BLD AUTO: 4.21 10*6/MM3 (ref 3.77–5.28)
SODIUM SERPL-SCNC: 139 MMOL/L (ref 136–145)
T4 FREE SERPL-MCNC: 1.12 NG/DL (ref 0.93–1.7)
TRIGL SERPL-MCNC: 57 MG/DL (ref 0–150)
TSH SERPL DL<=0.05 MIU/L-ACNC: 1.32 UIU/ML (ref 0.27–4.2)
VIT B12 BLD-MCNC: 335 PG/ML (ref 211–946)
VLDLC SERPL-MCNC: 11 MG/DL (ref 5–40)
WBC NRBC COR # BLD: 4.65 10*3/MM3 (ref 3.4–10.8)

## 2023-03-31 PROCEDURE — 82607 VITAMIN B-12: CPT

## 2023-03-31 PROCEDURE — 86803 HEPATITIS C AB TEST: CPT

## 2023-03-31 PROCEDURE — 84439 ASSAY OF FREE THYROXINE: CPT

## 2023-03-31 PROCEDURE — 80061 LIPID PANEL: CPT

## 2023-03-31 PROCEDURE — 80050 GENERAL HEALTH PANEL: CPT

## 2023-03-31 PROCEDURE — 82306 VITAMIN D 25 HYDROXY: CPT

## 2023-03-31 PROCEDURE — 36415 COLL VENOUS BLD VENIPUNCTURE: CPT

## 2023-03-31 NOTE — TELEPHONE ENCOUNTER
Caller: Margret Yeager    Relationship: Self    Best call back number: [phone]    What is the best time to reach you: ANY     Who are you requesting to speak with (clinical staff, provider,  specific staff member): CLINICAL     What was the call regarding: PATIENT  CALLED TO LET JEAN-PAUL KNOW THE PHARMACY STATED SHE WOULD NEED A PA FOR HER WEIGHT LOSS INJECTION. PLEASE ADVISE.    Do you require a callback: YES

## 2023-03-31 NOTE — TELEPHONE ENCOUNTER
Call patient, tell her her chemistry profile was perfect, her thyroid levels were normal, her vitamin B12 level was still a little bit low at 333 and she could take some B12 vitamins her blood counts were normal and vitamin D level was normal

## 2023-04-17 ENCOUNTER — PREP FOR SURGERY (OUTPATIENT)
Dept: OTHER | Facility: HOSPITAL | Age: 58
End: 2023-04-17
Payer: COMMERCIAL

## 2023-04-17 ENCOUNTER — CLINICAL SUPPORT (OUTPATIENT)
Dept: GASTROENTEROLOGY | Facility: CLINIC | Age: 58
End: 2023-04-17
Payer: COMMERCIAL

## 2023-04-17 DIAGNOSIS — Z12.11 COLON CANCER SCREENING: Primary | ICD-10-CM

## 2023-05-05 ENCOUNTER — TELEPHONE (OUTPATIENT)
Dept: INTERNAL MEDICINE | Facility: CLINIC | Age: 58
End: 2023-05-05
Payer: COMMERCIAL

## 2023-05-05 NOTE — TELEPHONE ENCOUNTER
----- Message from Margret Yeager sent at 5/4/2023 10:30 PM EDT -----  Regarding: Wegovy   Contact: [phone]  I am probably confused but I have been doing the Wegovy shot for 1 month….set to refill on May 11. For some reason I was thinking Dr. Farley said I would do a month & then the dosage would change.  I’m probably wrong but wanted to check before I requested the refill.   Thank you!!!  Margret

## 2023-05-07 NOTE — TELEPHONE ENCOUNTER
This is correct but please ask her what dose she is on when you get the call the first time and just put that in the note to me so I will know which dose to go up to next

## 2023-05-08 RX ORDER — SEMAGLUTIDE 1 MG/.5ML
1 INJECTION, SOLUTION SUBCUTANEOUS WEEKLY
Qty: 2 ML | Refills: 0 | Status: SHIPPED | OUTPATIENT
Start: 2023-05-08

## 2023-05-08 NOTE — TELEPHONE ENCOUNTER
Tell her I am sending in 2 prescriptions---- 1 for the first month at the 0.5 mg of the Wegovy and then I am sending in another prescription for 1 mg dose of Wegovy for the second month so she can start taking those as directed, I sent those to [facility] pharmacy

## 2023-06-28 PROBLEM — K21.9 GASTROESOPHAGEAL REFLUX DISEASE WITHOUT ESOPHAGITIS: Status: ACTIVE | Noted: 2023-06-28

## 2023-07-17 NOTE — PRE-PROCEDURE INSTRUCTIONS
"Instructed on date and arrival time of 0700. Come to entrance \"C\". Must have  over age 18 to drive home.  May have two visitors; however, children under 12 must stay in waiting room.  Discussed clear liquid diet (no red or purple) and bowel prep.  May take medications as usual except for blood thinners, diabetic medications, and weight loss medications.  Bring list of medications.  Verbalized understanding of instructions given.  Phone number given for questions or concerns.  "

## 2023-07-20 ENCOUNTER — HOSPITAL ENCOUNTER (OUTPATIENT)
Facility: HOSPITAL | Age: 58
Setting detail: HOSPITAL OUTPATIENT SURGERY
Discharge: HOME | End: 2023-07-20
Attending: INTERNAL MEDICINE | Admitting: INTERNAL MEDICINE
Payer: COMMERCIAL

## 2023-07-20 VITALS
DIASTOLIC BLOOD PRESSURE: 73 MMHG | RESPIRATION RATE: 18 BRPM | SYSTOLIC BLOOD PRESSURE: 118 MMHG | BODY MASS INDEX: 31.93 KG/M2 | WEIGHT: 174.6 LBS | HEART RATE: 84 BPM | TEMPERATURE: 97 F | OXYGEN SATURATION: 97 %

## 2023-07-20 DIAGNOSIS — K21.9 GERD (GASTROESOPHAGEAL REFLUX DISEASE): ICD-10-CM

## 2023-07-20 DIAGNOSIS — Z12.11 COLON CANCER SCREENING: ICD-10-CM

## 2023-07-20 PROCEDURE — 43239 EGD BIOPSY SINGLE/MULTIPLE: CPT | Performed by: INTERNAL MEDICINE

## 2023-07-20 PROCEDURE — 88305 TISSUE EXAM BY PATHOLOGIST: CPT | Performed by: INTERNAL MEDICINE

## 2023-07-20 PROCEDURE — 45385 COLONOSCOPY W/LESION REMOVAL: CPT | Performed by: INTERNAL MEDICINE

## 2023-07-20 RX ORDER — SODIUM CHLORIDE, SODIUM LACTATE, POTASSIUM CHLORIDE, CALCIUM CHLORIDE 600; 310; 30; 20 MG/100ML; MG/100ML; MG/100ML; MG/100ML
30 INJECTION, SOLUTION INTRAVENOUS CONTINUOUS
Status: DISCONTINUED | OUTPATIENT
Start: 2023-07-20 | End: 2023-07-20 | Stop reason: HOSPADM

## 2023-07-20 NOTE — H&P
Pre Procedure History & Physical    Chief Complaint:   GERD, surveillance colonoscopy    Subjective     HPI:   57 yo F here for eval of GERD, surveillance colonoscopy.    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       Past Surgical History:  Past Surgical History:   Procedure Laterality Date    APPENDECTOMY  1989     SECTION  , ,     CHOLECYSTECTOMY   or     COLONOSCOPY      HYSTERECTOMY  2012    OTHER SURGICAL HISTORY      Joint surgery    SHOULDER SURGERY      UPPER GASTROINTESTINAL ENDOSCOPY         Family History:  Family History   Problem Relation Age of Onset    Stroke Mother     Arthritis Mother     Hearing loss Mother     Hyperlipidemia Mother     Heart disease Father     Arthritis Father     Esophageal cancer Maternal Grandmother     Cervical cancer Maternal Grandmother     Colon cancer Paternal Grandmother     Osteoporosis Son     Developmental Disability Son     Learning disabilities Son     Liver disease Son     Vision loss Son        Social History:   reports that she has never smoked. She has never used smokeless tobacco. She reports current alcohol use. She reports that she does not use drugs.    Medications:   Medications Prior to Admission   Medication Sig Dispense Refill Last Dose    buPROPion XL (Wellbutrin XL) 300 MG 24 hr tablet Take 1 tablet by mouth Daily. 90 tablet 3     Liraglutide (SAXENDA) 18 MG/3ML injection pen Inject 1.8 mg under the skin into the appropriate area as directed Daily. 15 mL 5     pantoprazole (Protonix) 40 MG EC tablet Take 1 tablet by mouth Daily. 90 tablet 3     Semaglutide-Weight Management (Wegovy) 1 MG/0.5ML solution auto-injector Inject 0.5 mL under the skin into the appropriate area as directed 1 (One) Time Per Week. 2 mL 0     sodium-potassium-magnesium sulfates (Suprep Bowel Prep Kit) 17.5-3.13-1.6 GM/177ML solution oral solution Take 1 bottle by mouth Every 12 (Twelve) Hours.  354 mL 0        Allergies:  Penicillins and Sulfa antibiotics    ROS:    Pertinent items are noted in HPI, all other systems reviewed and negative     Objective     Weight 79.2 kg (174 lb 9.7 oz).    Physical Exam   Constitutional: Pt is oriented to person, place, and time and well-developed, well-nourished, and in no distress.   Mouth/Throat: Oropharynx is clear and moist.   Neck: Normal range of motion.   Cardiovascular: Normal rate, regular rhythm and normal heart sounds.    Pulmonary/Chest: Effort normal and breath sounds normal.   Abdominal: Soft. Nontender  Skin: Skin is warm and dry.   Psychiatric: Mood, memory, affect and judgment normal.     Assessment & Plan     Diagnosis:  GERD, surveillance colonoscopy    Anticipated Surgical Procedure:  EGD/colonoscopy    The risks, benefits, and alternatives of this procedure have been discussed with the patient or the responsible party- the patient understands and agrees to proceed.

## 2023-07-21 LAB
CYTO UR: NORMAL
LAB AP CASE REPORT: NORMAL
LAB AP CLINICAL INFORMATION: NORMAL
PATH REPORT.FINAL DX SPEC: NORMAL
PATH REPORT.GROSS SPEC: NORMAL

## 2023-07-27 ENCOUNTER — TELEPHONE (OUTPATIENT)
Dept: INTERNAL MEDICINE | Facility: CLINIC | Age: 58
End: 2023-07-27
Payer: COMMERCIAL

## 2023-07-27 NOTE — TELEPHONE ENCOUNTER
----- Message from Margret Morales sent at 7/27/2023  9:03 AM EDT -----  Regarding: FW: Saxenda dosage  Contact: [phone]    ----- Message -----  From: Margret Yeager  Sent: 7/25/2023  10:40 PM EDT  To: [facility] Clinical Pool  Subject: Saxenda dosage                                   Dr. Farley told me that after I had started using the Saxenda after going off the Wegovy that if I did ok & had no issues to let him know & he would increase the dosage on the Saxenda. We started at a lower dosage since I changed. I have done fine so would like to see if he will increase.   I am currently taking 1.8 ml of the Saxenda.     Thank you!!!

## 2023-08-01 ENCOUNTER — TELEPHONE (OUTPATIENT)
Dept: INTERNAL MEDICINE | Facility: CLINIC | Age: 58
End: 2023-08-01
Payer: COMMERCIAL

## 2023-08-01 ENCOUNTER — PATIENT MESSAGE (OUTPATIENT)
Dept: INTERNAL MEDICINE | Facility: CLINIC | Age: 58
End: 2023-08-01
Payer: COMMERCIAL

## 2023-08-01 DIAGNOSIS — M79.672 LEFT FOOT PAIN: Primary | ICD-10-CM

## 2023-08-03 ENCOUNTER — HOSPITAL ENCOUNTER (OUTPATIENT)
Dept: GENERAL RADIOLOGY | Facility: HOSPITAL | Age: 58
Discharge: HOME | End: 2023-08-03
Admitting: INTERNAL MEDICINE
Payer: COMMERCIAL

## 2023-08-03 DIAGNOSIS — M79.672 LEFT FOOT PAIN: ICD-10-CM

## 2023-08-03 PROCEDURE — 73630 X-RAY EXAM OF FOOT: CPT

## 2023-09-14 PROCEDURE — 87086 URINE CULTURE/COLONY COUNT: CPT | Performed by: NURSE PRACTITIONER

## 2023-09-14 PROCEDURE — 87088 URINE BACTERIA CULTURE: CPT | Performed by: NURSE PRACTITIONER

## 2023-09-14 PROCEDURE — 87186 SC STD MICRODIL/AGAR DIL: CPT | Performed by: NURSE PRACTITIONER

## 2023-09-28 ENCOUNTER — OFFICE VISIT (OUTPATIENT)
Dept: INTERNAL MEDICINE | Facility: CLINIC | Age: 58
End: 2023-09-28
Payer: COMMERCIAL

## 2023-09-28 VITALS
HEIGHT: 61 IN | WEIGHT: 174 LBS | DIASTOLIC BLOOD PRESSURE: 92 MMHG | TEMPERATURE: 97.6 F | BODY MASS INDEX: 32.85 KG/M2 | OXYGEN SATURATION: 95 % | SYSTOLIC BLOOD PRESSURE: 132 MMHG | HEART RATE: 77 BPM

## 2023-09-28 DIAGNOSIS — F41.1 ANXIETY, GENERALIZED: Primary | ICD-10-CM

## 2023-09-28 DIAGNOSIS — Z71.3 WEIGHT LOSS COUNSELING, ENCOUNTER FOR: ICD-10-CM

## 2023-09-28 DIAGNOSIS — K21.9 GASTROESOPHAGEAL REFLUX DISEASE WITHOUT ESOPHAGITIS: ICD-10-CM

## 2023-09-28 DIAGNOSIS — R63.5 EXCESSIVE WEIGHT GAIN: ICD-10-CM

## 2023-09-28 PROCEDURE — 99213 OFFICE O/P EST LOW 20 MIN: CPT | Performed by: INTERNAL MEDICINE

## 2023-09-28 NOTE — PROGRESS NOTES
"CHIEF COMPLAINT/ HPI:  Anxiety (Routine follow up, pt states last saxenda injection was 08/20/23. Has been out of it due to it not being available. )    Home blood pressures have been good, she had a recent UTI, she has had trouble getting her weight loss medication Saxenda, she is maintained her weight however even without the injections, says stress levels were okay          Objective   Vital Signs  Vitals:    09/28/23 1423   BP: 132/92   Pulse: 77   Temp: 97.6 °F (36.4 °C)   SpO2: 95%   Weight: 78.9 kg (174 lb)   Height: 154.9 cm (60.98\")      Body mass index is 32.89 kg/m².  Review of Systems   Constitutional: Negative.    HENT: Negative.     Eyes: Negative.    Respiratory: Negative.     Cardiovascular: Negative.    Gastrointestinal: Negative.    Endocrine: Negative.    Genitourinary: Negative.    Musculoskeletal: Negative.    Allergic/Immunologic: Negative.    Neurological: Negative.    Hematological: Negative.    Psychiatric/Behavioral: Negative.      Physical Exam  Constitutional:       General: She is not in acute distress.     Appearance: Normal appearance.   HENT:      Head: Normocephalic.      Mouth/Throat:      Mouth: Mucous membranes are moist.   Eyes:      Conjunctiva/sclera: Conjunctivae normal.      Pupils: Pupils are equal, round, and reactive to light.   Cardiovascular:      Rate and Rhythm: Normal rate and regular rhythm.      Pulses: Normal pulses.      Heart sounds: Normal heart sounds.   Pulmonary:      Effort: Pulmonary effort is normal.      Breath sounds: Normal breath sounds.   Abdominal:      General: Abdomen is flat. Bowel sounds are normal.      Palpations: Abdomen is soft.   Musculoskeletal:         General: No swelling. Normal range of motion.      Cervical back: Neck supple.   Skin:     General: Skin is warm and dry.      Coloration: Skin is not jaundiced.   Neurological:      General: No focal deficit present.      Mental Status: She is alert and oriented to person, place, and time. " Mental status is at baseline.   Psychiatric:         Mood and Affect: Mood normal.         Behavior: Behavior normal.         Thought Content: Thought content normal.         Judgment: Judgment normal.      Result Review :   No results found for: PROBNP, BNP  CMP          3/31/2023    09:17   CMP   Glucose 91    BUN 18    Creatinine 0.81    EGFR 84.8    Sodium 139    Potassium 4.2    Chloride 105    Calcium 9.9    Total Protein 7.2    Albumin 4.6    Globulin 2.6    Total Bilirubin 0.3    Alkaline Phosphatase 43    AST (SGOT) 20    ALT (SGPT) 19    Albumin/Globulin Ratio 1.8    BUN/Creatinine Ratio 22.2    Anion Gap 11.2      CBC w/diff          3/31/2023    09:17   CBC w/Diff   WBC 4.65    RBC 4.21    Hemoglobin 12.5    Hematocrit 38.3    MCV 91.0    MCH 29.7    MCHC 32.6    RDW 13.0    Platelets 366    Neutrophil Rel % 43.7    Immature Granulocyte Rel % 0.2    Lymphocyte Rel % 38.3    Monocyte Rel % 11.6    Eosinophil Rel % 4.3    Basophil Rel % 1.9       Lipid Panel          3/31/2023    09:17   Lipid Panel   Total Cholesterol 189    Triglycerides 57    HDL Cholesterol 51    VLDL Cholesterol 11    LDL Cholesterol  127    LDL/HDL Ratio 2.48       Lab Results   Component Value Date    TSH 1.320 03/31/2023    TSH 1.370 05/03/2021    TSH 0.368 12/10/2019      Lab Results   Component Value Date    FREET4 1.12 03/31/2023    FREET4 1.1 12/10/2019                          Visit Diagnoses:    ICD-10-CM ICD-9-CM   1. Anxiety, generalized  F41.1 300.02   2. Excessive weight gain  R63.5 783.1   3. Weight loss counseling, encounter for  Z71.3 V65.3   4. Gastroesophageal reflux disease without esophagitis  K21.9 530.81       Assessment and Plan   Diagnoses and all orders for this visit:    1. Anxiety, generalized (Primary)  -     CBC & Differential; Future  -     Comprehensive Metabolic Panel; Future  -     Lipid Panel; Future  -     Vitamin B12 anemia; Future  -     Folate anemia; Future    2. Excessive weight gain  -     CBC  & Differential; Future  -     Comprehensive Metabolic Panel; Future  -     Lipid Panel; Future  -     Vitamin B12 anemia; Future  -     Folate anemia; Future    3. Weight loss counseling, encounter for  -     CBC & Differential; Future  -     Comprehensive Metabolic Panel; Future  -     Lipid Panel; Future  -     Vitamin B12 anemia; Future  -     Folate anemia; Future    4. Gastroesophageal reflux disease without esophagitis  -     CBC & Differential; Future  -     Comprehensive Metabolic Panel; Future  -     Lipid Panel; Future  -     Vitamin B12 anemia; Future  -     Folate anemia; Future    Other orders  -     Semaglutide-Weight Management 2.4 MG/0.75ML solution auto-injector; Inject 2.4 mg under the skin into the appropriate area as directed 1 (One) Time Per Week.  Dispense: 3 mL; Refill: 5        Weight loss counseling today, ---will cont wegovy 2.4 q week new prescription sent in September 28, 2023 to see if she can get it at a different pharmacy due to constraints from the manufacturing    Mild anxiety and depression, cont  Wellbutrin  mg daily,     Elevated blood pressure, home blood pressures are doing well, no medicine no treatment currently,    Dyspepsia, globus sensation in her throat, possibly related to stress anxiety reflux disease, blood pressure, -c scope / egd April 2023,--, patient did have several polyps, will need follow-up    cont protonix --dr lyle               Follow Up   Return in about 6 months (around 3/28/2024).  Patient was given instructions and counseling regarding her condition or for health maintenance advice. Please see specific information pulled into the AVS if appropriate.       Answers submitted by the patient for this visit:  Other (Submitted on 9/21/2023)  Please describe your symptoms.: Follow up for Caroline  Have you had these symptoms before?: Yes  How long have you been having these symptoms?: 1-2 weeks  Please list any medications you are currently taking for  this condition.: Saxenda, Pantoprazole, buPROPion XL  Primary Reason for Visit (Submitted on 9/21/2023)  What is the primary reason for your visit?: Other

## 2023-09-29 ENCOUNTER — TELEPHONE (OUTPATIENT)
Dept: INTERNAL MEDICINE | Facility: CLINIC | Age: 58
End: 2023-09-29
Payer: COMMERCIAL

## 2023-09-29 RX ORDER — VALACYCLOVIR HYDROCHLORIDE 500 MG/1
500 TABLET, FILM COATED ORAL 2 TIMES DAILY PRN
Qty: 45 TABLET | Refills: 3 | Status: SHIPPED | OUTPATIENT
Start: 2023-09-29

## 2023-09-29 NOTE — TELEPHONE ENCOUNTER
----- Message from Carrol London MA sent at 9/29/2023  1:52 PM EDT -----  Regarding: FW: Valtrex request  Contact: [phone]  Please advise   ----- Message -----  From: Margret Yeager  Sent: 9/29/2023   8:31 AM EDT  To: [facility] Clinical Pool  Subject: Valtrex request                                  We are going to [place] in October. I wanted to request a prescription for Valtrex for fever blisters since I always seem to get them now.   Thank you!!!  Margret

## 2023-10-02 ENCOUNTER — PATIENT MESSAGE (OUTPATIENT)
Dept: INTERNAL MEDICINE | Facility: CLINIC | Age: 58
End: 2023-10-02
Payer: COMMERCIAL

## 2023-10-03 ENCOUNTER — TELEPHONE (OUTPATIENT)
Dept: INTERNAL MEDICINE | Facility: CLINIC | Age: 58
End: 2023-10-03
Payer: COMMERCIAL

## 2023-10-03 RX ORDER — ONDANSETRON 4 MG/1
4 TABLET, ORALLY DISINTEGRATING ORAL EVERY 6 HOURS PRN
Qty: 40 TABLET | Refills: 1 | Status: SHIPPED | OUTPATIENT
Start: 2023-10-03

## 2023-10-03 NOTE — TELEPHONE ENCOUNTER
Call patient, tell her we will send in a prescription for Zofran    call in Zofran 4 mg every 6 hours as needed #40 with 1 refill

## 2023-10-03 NOTE — TELEPHONE ENCOUNTER
----- Message from Margret Morales sent at 10/3/2023  9:26 AM EDT -----  Regarding: FW: Zofran request  Contact: [phone]    ----- Message -----  From: Margret Yeager  Sent: 10/2/2023   5:47 PM EDT  To: [facility]  Subject: Zofran request                                   Started back on the Wegovy shot & got super sick. It has passed now…and I have read it can do that at first. Just thought since I’d had before I wouldn’t have an issue. Anyway…wanted to see if I could get a script for Zofran. Several things I’ve read  said that helps with the nausea. Said it usually lasts a couple weeks.

## 2024-02-01 RX ORDER — SEMAGLUTIDE 2.4 MG/.75ML
2.4 INJECTION, SOLUTION SUBCUTANEOUS WEEKLY
Qty: 3 ML | Refills: 5 | Status: CANCELLED | OUTPATIENT
Start: 2024-02-01

## 2024-03-05 RX ORDER — SEMAGLUTIDE 2.4 MG/.75ML
2.4 INJECTION, SOLUTION SUBCUTANEOUS WEEKLY
Qty: 3 ML | Refills: 5 | Status: SHIPPED | OUTPATIENT
Start: 2024-03-05

## 2024-03-11 ENCOUNTER — TRANSCRIBE ORDERS (OUTPATIENT)
Dept: ADMINISTRATIVE | Facility: HOSPITAL | Age: 59
End: 2024-03-11
Payer: COMMERCIAL

## 2024-03-11 DIAGNOSIS — M81.0 SENILE OSTEOPOROSIS: Primary | ICD-10-CM

## 2024-03-12 DIAGNOSIS — M81.0 SENILE OSTEOPOROSIS: Primary | ICD-10-CM

## 2024-03-12 RX ORDER — ZOLEDRONIC ACID 5 MG/100ML
5 INJECTION, SOLUTION INTRAVENOUS ONCE
OUTPATIENT
Start: 2024-03-21

## 2024-03-21 ENCOUNTER — HOSPITAL ENCOUNTER (OUTPATIENT)
Dept: INFUSION THERAPY | Facility: HOSPITAL | Age: 59
Discharge: HOME | End: 2024-03-21
Payer: COMMERCIAL

## 2024-03-21 VITALS
SYSTOLIC BLOOD PRESSURE: 123 MMHG | OXYGEN SATURATION: 99 % | HEIGHT: 61 IN | WEIGHT: 149.91 LBS | HEART RATE: 72 BPM | DIASTOLIC BLOOD PRESSURE: 78 MMHG | RESPIRATION RATE: 18 BRPM | BODY MASS INDEX: 28.3 KG/M2 | TEMPERATURE: 97.6 F

## 2024-03-21 DIAGNOSIS — K21.9 GASTROESOPHAGEAL REFLUX DISEASE WITHOUT ESOPHAGITIS: ICD-10-CM

## 2024-03-21 DIAGNOSIS — M81.0 SENILE OSTEOPOROSIS: Primary | ICD-10-CM

## 2024-03-21 DIAGNOSIS — R63.5 EXCESSIVE WEIGHT GAIN: ICD-10-CM

## 2024-03-21 DIAGNOSIS — F41.1 ANXIETY, GENERALIZED: ICD-10-CM

## 2024-03-21 DIAGNOSIS — Z71.3 WEIGHT LOSS COUNSELING, ENCOUNTER FOR: ICD-10-CM

## 2024-03-21 LAB
ALBUMIN SERPL-MCNC: 4.2 G/DL (ref 3.5–5.2)
ALBUMIN/GLOB SERPL: 1.6 G/DL
ALP SERPL-CCNC: 56 U/L (ref 39–117)
ALT SERPL W P-5'-P-CCNC: 13 U/L (ref 1–33)
ANION GAP SERPL CALCULATED.3IONS-SCNC: 10.9 MMOL/L (ref 5–15)
AST SERPL-CCNC: 18 U/L (ref 1–32)
BASOPHILS # BLD AUTO: 0.06 10*3/MM3 (ref 0–0.2)
BASOPHILS NFR BLD AUTO: 1.2 % (ref 0–1.5)
BILIRUB SERPL-MCNC: 0.3 MG/DL (ref 0–1.2)
BUN SERPL-MCNC: 10 MG/DL (ref 6–20)
BUN/CREAT SERPL: 13.2 (ref 7–25)
CALCIUM SPEC-SCNC: 9.2 MG/DL (ref 8.6–10.5)
CHLORIDE SERPL-SCNC: 103 MMOL/L (ref 98–107)
CHOLEST SERPL-MCNC: 205 MG/DL (ref 0–200)
CO2 SERPL-SCNC: 24.1 MMOL/L (ref 22–29)
CREAT SERPL-MCNC: 0.76 MG/DL (ref 0.57–1)
DEPRECATED RDW RBC AUTO: 39.4 FL (ref 37–54)
EGFRCR SERPLBLD CKD-EPI 2021: 91 ML/MIN/1.73
EOSINOPHIL # BLD AUTO: 0.17 10*3/MM3 (ref 0–0.4)
EOSINOPHIL NFR BLD AUTO: 3.3 % (ref 0.3–6.2)
ERYTHROCYTE [DISTWIDTH] IN BLOOD BY AUTOMATED COUNT: 12.3 % (ref 12.3–15.4)
FOLATE SERPL-MCNC: 19.9 NG/ML (ref 4.78–24.2)
GLOBULIN UR ELPH-MCNC: 2.6 GM/DL
GLUCOSE SERPL-MCNC: 92 MG/DL (ref 65–99)
HCT VFR BLD AUTO: 34.7 % (ref 34–46.6)
HDLC SERPL-MCNC: 51 MG/DL (ref 40–60)
HGB BLD-MCNC: 12.1 G/DL (ref 12–15.9)
IMM GRANULOCYTES # BLD AUTO: 0.01 10*3/MM3 (ref 0–0.05)
IMM GRANULOCYTES NFR BLD AUTO: 0.2 % (ref 0–0.5)
LDLC SERPL CALC-MCNC: 137 MG/DL (ref 0–100)
LDLC/HDLC SERPL: 2.66 {RATIO}
LYMPHOCYTES # BLD AUTO: 1.71 10*3/MM3 (ref 0.7–3.1)
LYMPHOCYTES NFR BLD AUTO: 32.9 % (ref 19.6–45.3)
MCH RBC QN AUTO: 30.5 PG (ref 26.6–33)
MCHC RBC AUTO-ENTMCNC: 34.9 G/DL (ref 31.5–35.7)
MCV RBC AUTO: 87.4 FL (ref 79–97)
MONOCYTES # BLD AUTO: 0.43 10*3/MM3 (ref 0.1–0.9)
MONOCYTES NFR BLD AUTO: 8.3 % (ref 5–12)
NEUTROPHILS NFR BLD AUTO: 2.81 10*3/MM3 (ref 1.7–7)
NEUTROPHILS NFR BLD AUTO: 54.1 % (ref 42.7–76)
NRBC BLD AUTO-RTO: 0 /100 WBC (ref 0–0.2)
PLATELET # BLD AUTO: 331 10*3/MM3 (ref 140–450)
PMV BLD AUTO: 10.7 FL (ref 6–12)
POTASSIUM SERPL-SCNC: 4.3 MMOL/L (ref 3.5–5.2)
PROT SERPL-MCNC: 6.8 G/DL (ref 6–8.5)
RBC # BLD AUTO: 3.97 10*6/MM3 (ref 3.77–5.28)
SODIUM SERPL-SCNC: 138 MMOL/L (ref 136–145)
TRIGL SERPL-MCNC: 92 MG/DL (ref 0–150)
VIT B12 BLD-MCNC: 229 PG/ML (ref 211–946)
VLDLC SERPL-MCNC: 17 MG/DL (ref 5–40)
WBC NRBC COR # BLD AUTO: 5.19 10*3/MM3 (ref 3.4–10.8)

## 2024-03-21 PROCEDURE — 96374 THER/PROPH/DIAG INJ IV PUSH: CPT

## 2024-03-21 PROCEDURE — 82607 VITAMIN B-12: CPT | Performed by: INTERNAL MEDICINE

## 2024-03-21 PROCEDURE — 82746 ASSAY OF FOLIC ACID SERUM: CPT | Performed by: INTERNAL MEDICINE

## 2024-03-21 PROCEDURE — 36415 COLL VENOUS BLD VENIPUNCTURE: CPT

## 2024-03-21 PROCEDURE — 80061 LIPID PANEL: CPT | Performed by: INTERNAL MEDICINE

## 2024-03-21 PROCEDURE — 25010000002 ZOLEDRONIC ACID 5 MG/100ML SOLUTION: Performed by: OBSTETRICS & GYNECOLOGY

## 2024-03-21 PROCEDURE — 80053 COMPREHEN METABOLIC PANEL: CPT | Performed by: INTERNAL MEDICINE

## 2024-03-21 PROCEDURE — 85025 COMPLETE CBC W/AUTO DIFF WBC: CPT | Performed by: INTERNAL MEDICINE

## 2024-03-21 RX ORDER — ZOLEDRONIC ACID 5 MG/100ML
5 INJECTION, SOLUTION INTRAVENOUS ONCE
Status: CANCELLED | OUTPATIENT
Start: 2024-03-21

## 2024-03-21 RX ORDER — ZOLEDRONIC ACID 5 MG/100ML
5 INJECTION, SOLUTION INTRAVENOUS ONCE
Status: COMPLETED | OUTPATIENT
Start: 2024-03-21 | End: 2024-03-21

## 2024-03-21 RX ADMIN — ZOLEDRONIC ACID 5 MG: 0.05 INJECTION, SOLUTION INTRAVENOUS at 17:19

## 2024-03-28 ENCOUNTER — OFFICE VISIT (OUTPATIENT)
Dept: INTERNAL MEDICINE | Age: 59
End: 2024-03-28
Payer: COMMERCIAL

## 2024-03-28 VITALS
SYSTOLIC BLOOD PRESSURE: 138 MMHG | HEART RATE: 95 BPM | TEMPERATURE: 99.2 F | WEIGHT: 149 LBS | DIASTOLIC BLOOD PRESSURE: 67 MMHG | HEIGHT: 61 IN | BODY MASS INDEX: 28.13 KG/M2 | OXYGEN SATURATION: 96 %

## 2024-03-28 DIAGNOSIS — R63.5 EXCESSIVE WEIGHT GAIN: Primary | ICD-10-CM

## 2024-03-28 DIAGNOSIS — F33.1 MAJOR DEPRESSIVE DISORDER, RECURRENT, MODERATE: ICD-10-CM

## 2024-03-28 DIAGNOSIS — Z71.3 WEIGHT LOSS COUNSELING, ENCOUNTER FOR: ICD-10-CM

## 2024-03-28 DIAGNOSIS — F41.1 ANXIETY, GENERALIZED: ICD-10-CM

## 2024-03-28 DIAGNOSIS — K21.9 GASTROESOPHAGEAL REFLUX DISEASE WITHOUT ESOPHAGITIS: ICD-10-CM

## 2024-03-28 PROCEDURE — 99214 OFFICE O/P EST MOD 30 MIN: CPT | Performed by: INTERNAL MEDICINE

## 2024-03-28 RX ORDER — SEMAGLUTIDE 1.7 MG/.75ML
1.7 INJECTION, SOLUTION SUBCUTANEOUS WEEKLY
Qty: 9 ML | Refills: 5 | Status: SHIPPED | OUTPATIENT
Start: 2024-03-28

## 2024-03-28 NOTE — PROGRESS NOTES
"CHIEF COMPLAINT/ HPI:f/u with wgt loss , , still on wegovy , and doing every other week     Anxiety (Routine follow up. Lab follow up, No concerns at this time. )              Objective   Vital Signs  Vitals:    03/28/24 1306   BP: 138/67   Pulse: 95   Temp: 99.2 °F (37.3 °C)   SpO2: 96%   Weight: 67.6 kg (149 lb)   Height: 154.9 cm (60.98\")      Body mass index is 28.17 kg/m².  Review of Systems   Constitutional: Negative.    HENT: Negative.     Eyes: Negative.    Respiratory: Negative.     Cardiovascular: Negative.    Gastrointestinal: Negative.    Endocrine: Negative.    Genitourinary: Negative.    Musculoskeletal: Negative.    Allergic/Immunologic: Negative.    Neurological: Negative.    Hematological: Negative.    Psychiatric/Behavioral: Negative.        Physical Exam  Constitutional:       General: She is not in acute distress.     Appearance: Normal appearance.   HENT:      Head: Normocephalic.      Mouth/Throat:      Mouth: Mucous membranes are moist.   Eyes:      Conjunctiva/sclera: Conjunctivae normal.      Pupils: Pupils are equal, round, and reactive to light.   Cardiovascular:      Rate and Rhythm: Normal rate and regular rhythm.      Pulses: Normal pulses.      Heart sounds: Normal heart sounds.   Pulmonary:      Effort: Pulmonary effort is normal.      Breath sounds: Normal breath sounds.   Abdominal:      General: Abdomen is flat. Bowel sounds are normal.      Palpations: Abdomen is soft.   Musculoskeletal:         General: No swelling. Normal range of motion.      Cervical back: Neck supple.   Skin:     General: Skin is warm and dry.      Coloration: Skin is not jaundiced.   Neurological:      General: No focal deficit present.      Mental Status: She is alert and oriented to person, place, and time. Mental status is at baseline.   Psychiatric:         Mood and Affect: Mood normal.         Behavior: Behavior normal.         Thought Content: Thought content normal.         Judgment: Judgment normal. " "       Result Review :   No results found for: \"PROBNP\", \"BNP\"  CMP          3/21/2024    16:22   CMP   Glucose 92    BUN 10    Creatinine 0.76    EGFR 91.0    Sodium 138    Potassium 4.3    Chloride 103    Calcium 9.2    Total Protein 6.8    Albumin 4.2    Globulin 2.6    Total Bilirubin 0.3    Alkaline Phosphatase 56    AST (SGOT) 18    ALT (SGPT) 13    Albumin/Globulin Ratio 1.6    BUN/Creatinine Ratio 13.2    Anion Gap 10.9      CBC w/diff          3/21/2024    16:22   CBC w/Diff   WBC 5.19    RBC 3.97    Hemoglobin 12.1    Hematocrit 34.7    MCV 87.4    MCH 30.5    MCHC 34.9    RDW 12.3    Platelets 331    Neutrophil Rel % 54.1    Immature Granulocyte Rel % 0.2    Lymphocyte Rel % 32.9    Monocyte Rel % 8.3    Eosinophil Rel % 3.3    Basophil Rel % 1.2       Lipid Panel          3/21/2024    16:22   Lipid Panel   Total Cholesterol 205    Triglycerides 92    HDL Cholesterol 51    VLDL Cholesterol 17    LDL Cholesterol  137    LDL/HDL Ratio 2.66       Lab Results   Component Value Date    TSH 1.320 03/31/2023    TSH 1.370 05/03/2021    TSH 0.368 12/10/2019      Lab Results   Component Value Date    FREET4 1.12 03/31/2023    FREET4 1.1 12/10/2019                          Visit Diagnoses:    ICD-10-CM ICD-9-CM   1. Excessive weight gain  R63.5 783.1   2. Anxiety, generalized  F41.1 300.02   3. Weight loss counseling, encounter for  Z71.3 V65.3   4. Gastroesophageal reflux disease without esophagitis  K21.9 530.81   5. Major depressive disorder, recurrent, moderate  F33.1 296.32       Assessment and Plan   Diagnoses and all orders for this visit:    1. Excessive weight gain (Primary)    2. Anxiety, generalized    3. Weight loss counseling, encounter for    4. Gastroesophageal reflux disease without esophagitis    5. Major depressive disorder, recurrent, moderate    Other orders  -     Semaglutide-Weight Management (Wegovy) 1.7 MG/0.75ML solution auto-injector; Inject 0.75 mL under the skin into the appropriate area " as directed 1 (One) Time Per Week.  Dispense: 9 mL; Refill: 5        Weight loss counseling today, ---will cont wegovy 2.4 q week new prescription sent in September 28, 2023--will try to reduce the dose to 1.7 mg, so she can space the weekly dosing to every 2 to 3 weeks without getting sick nauseated, new prescription sent in March 28, 2024    Mild anxiety and depression, cont  Wellbutrin  mg daily,     Elevated blood pressure, patient will continue monitoring blood pressure    Mixed hyperlipidemia continue to monitor no treatment,    UTI, taking Cipro March 2024    Dyspepsia, globus sensation in her throat, ---- -c scope / egd April 2023,--, patient did have several polyps,    cont protonix --dr lyle     B12 deficiency, recommend over-the-counter 1000 mcg daily          Follow Up   Return in about 6 months (around 9/28/2024).  Patient was given instructions and counseling regarding her condition or for health maintenance advice. Please see specific information pulled into the AVS if appropriate.         Answers submitted by the patient for this visit:  Other (Submitted on 3/21/2024)  Please describe your symptoms.: Follow up/routine  Have you had these symptoms before?: No  How long have you been having these symptoms?: 1-4 days  Please list any medications you are currently taking for this condition.: Wegovy 2.4 weekly, Pantoprazole 40mg daily, buPROPion XL 300mg daily, Zofran 4mg as needed  Primary Reason for Visit (Submitted on 3/21/2024)  What is the primary reason for your visit?: Other

## 2024-04-12 ENCOUNTER — TELEPHONE (OUTPATIENT)
Dept: INTERNAL MEDICINE | Age: 59
End: 2024-04-12
Payer: COMMERCIAL

## 2024-04-12 DIAGNOSIS — R63.5 EXCESSIVE WEIGHT GAIN: Primary | ICD-10-CM

## 2024-04-22 ENCOUNTER — TELEPHONE (OUTPATIENT)
Dept: INTERNAL MEDICINE | Age: 59
End: 2024-04-22
Payer: COMMERCIAL

## 2024-04-22 RX ORDER — SEMAGLUTIDE 2.4 MG/.75ML
2.4 INJECTION, SOLUTION SUBCUTANEOUS WEEKLY
Qty: 3 ML | Refills: 5 | Status: SHIPPED | OUTPATIENT
Start: 2024-04-22

## 2024-04-22 NOTE — TELEPHONE ENCOUNTER
Regarding: Wegovy  Contact: [phone]  ----- Message from Betsy Torres MA sent at 4/22/2024  3:43 PM EDT -----       ----- Message from Margret Yeager to Juliocesar Farley MD sent at 4/22/2024  1:00 PM -----   I called today to check and make sure the pharmacy had changed my Wegovy back to 2.4…..not the 1.7. They said the 2.4 was cancelled but not the 1.7……that the 2.4 needs  to be added & the 1.7 deleted.     Can you check this please?? I thought the change was already made but she said it has not.   Thx

## 2024-04-22 NOTE — TELEPHONE ENCOUNTER
Regarding: Wegovy  Contact: [phone]  ----- Message from Betsy Torres MA sent at 4/22/2024  3:43 PM EDT -----       ----- Message from Margret Yeager to Juliocesar Farley MD sent at 4/22/2024  1:00 PM -----   I called today to check and make sure the pharmacy had changed my Wegovy back to 2.4…..not the 1.7. They said the 2.4 was cancelled but not the 1.7……that the 2.4 needs  to be added & the 1.7 deleted.     Can you check this please?? I thought the change was already made but she said it has not.   Thx

## 2024-06-22 ENCOUNTER — TELEPHONE (OUTPATIENT)
Dept: INTERNAL MEDICINE | Age: 59
End: 2024-06-22
Payer: COMMERCIAL

## 2024-06-22 NOTE — TELEPHONE ENCOUNTER
Call patient tell her she can cancel that appointment in the fall if she would like, she can also go to [facility] to see about getting the medication cheaper through a compounded pharmacy although we cannot definitively recommend that, lots of people are doing it if that is what she wants to do otherwise I can just see her at her yearly checkup.  If she wants to come in sooner to discuss other issues that is fine to just make her an appointment at her convenience

## 2024-06-22 NOTE — TELEPHONE ENCOUNTER
----- Message from Annalise GUERRA sent at 6/21/2024  2:47 PM EDT -----  Regarding: FW: Wegovy  Contact: [phone]  Please advise  ----- Message -----  From: Margret Yeager  Sent: 6/19/2024  11:30 PM EDT  To: [facility]  Subject: Wegovy                                           I work at [employer] & they will no longer pay for my Wegovy prescription. I have a follow-up appt that was scheduled for September to just check on how I was doing with the Wegovy. Since I am now not taking it, wondered if we should move appt up or cancel??   Thx

## 2024-09-18 ENCOUNTER — TELEPHONE (OUTPATIENT)
Dept: INTERNAL MEDICINE | Age: 59
End: 2024-09-18
Payer: COMMERCIAL

## 2024-09-18 DIAGNOSIS — Z00.00 ANNUAL PHYSICAL EXAM: Primary | ICD-10-CM

## 2024-09-23 ENCOUNTER — LAB (OUTPATIENT)
Dept: LAB | Facility: HOSPITAL | Age: 59
End: 2024-09-23
Payer: COMMERCIAL

## 2024-09-23 DIAGNOSIS — Z00.00 ANNUAL PHYSICAL EXAM: ICD-10-CM

## 2024-09-23 LAB
ALBUMIN SERPL-MCNC: 4.1 G/DL (ref 3.5–5.2)
ALBUMIN/GLOB SERPL: 1.5 G/DL
ALP SERPL-CCNC: 35 U/L (ref 39–117)
ALT SERPL W P-5'-P-CCNC: 13 U/L (ref 1–33)
ANION GAP SERPL CALCULATED.3IONS-SCNC: 7.9 MMOL/L (ref 5–15)
AST SERPL-CCNC: 17 U/L (ref 1–32)
BASOPHILS # BLD AUTO: 0.06 10*3/MM3 (ref 0–0.2)
BASOPHILS NFR BLD AUTO: 1.4 % (ref 0–1.5)
BILIRUB SERPL-MCNC: 0.3 MG/DL (ref 0–1.2)
BUN SERPL-MCNC: 14 MG/DL (ref 6–20)
BUN/CREAT SERPL: 19.7 (ref 7–25)
CALCIUM SPEC-SCNC: 9.8 MG/DL (ref 8.6–10.5)
CHLORIDE SERPL-SCNC: 106 MMOL/L (ref 98–107)
CHOLEST SERPL-MCNC: 188 MG/DL (ref 0–200)
CO2 SERPL-SCNC: 27.1 MMOL/L (ref 22–29)
CREAT SERPL-MCNC: 0.71 MG/DL (ref 0.57–1)
DEPRECATED RDW RBC AUTO: 42 FL (ref 37–54)
EGFRCR SERPLBLD CKD-EPI 2021: 98.1 ML/MIN/1.73
EOSINOPHIL # BLD AUTO: 0.18 10*3/MM3 (ref 0–0.4)
EOSINOPHIL NFR BLD AUTO: 4.1 % (ref 0.3–6.2)
ERYTHROCYTE [DISTWIDTH] IN BLOOD BY AUTOMATED COUNT: 12.4 % (ref 12.3–15.4)
FOLATE SERPL-MCNC: 14 NG/ML (ref 4.78–24.2)
GLOBULIN UR ELPH-MCNC: 2.7 GM/DL
GLUCOSE SERPL-MCNC: 91 MG/DL (ref 65–99)
HCT VFR BLD AUTO: 36.9 % (ref 34–46.6)
HDLC SERPL-MCNC: 54 MG/DL (ref 40–60)
HGB BLD-MCNC: 12.5 G/DL (ref 12–15.9)
IMM GRANULOCYTES # BLD AUTO: 0.01 10*3/MM3 (ref 0–0.05)
IMM GRANULOCYTES NFR BLD AUTO: 0.2 % (ref 0–0.5)
LDLC SERPL CALC-MCNC: 125 MG/DL (ref 0–100)
LDLC/HDLC SERPL: 2.3 {RATIO}
LYMPHOCYTES # BLD AUTO: 1.63 10*3/MM3 (ref 0.7–3.1)
LYMPHOCYTES NFR BLD AUTO: 37.1 % (ref 19.6–45.3)
MCH RBC QN AUTO: 31.3 PG (ref 26.6–33)
MCHC RBC AUTO-ENTMCNC: 33.9 G/DL (ref 31.5–35.7)
MCV RBC AUTO: 92.3 FL (ref 79–97)
MONOCYTES # BLD AUTO: 0.45 10*3/MM3 (ref 0.1–0.9)
MONOCYTES NFR BLD AUTO: 10.3 % (ref 5–12)
NEUTROPHILS NFR BLD AUTO: 2.06 10*3/MM3 (ref 1.7–7)
NEUTROPHILS NFR BLD AUTO: 46.9 % (ref 42.7–76)
NRBC BLD AUTO-RTO: 0 /100 WBC (ref 0–0.2)
PLATELET # BLD AUTO: 306 10*3/MM3 (ref 140–450)
PMV BLD AUTO: 11.2 FL (ref 6–12)
POTASSIUM SERPL-SCNC: 4.9 MMOL/L (ref 3.5–5.2)
PROT SERPL-MCNC: 6.8 G/DL (ref 6–8.5)
RBC # BLD AUTO: 4 10*6/MM3 (ref 3.77–5.28)
SODIUM SERPL-SCNC: 141 MMOL/L (ref 136–145)
T4 FREE SERPL-MCNC: 1.17 NG/DL (ref 0.92–1.68)
TRIGL SERPL-MCNC: 48 MG/DL (ref 0–150)
TSH SERPL DL<=0.05 MIU/L-ACNC: 1.8 UIU/ML (ref 0.27–4.2)
VIT B12 BLD-MCNC: 231 PG/ML (ref 211–946)
VLDLC SERPL-MCNC: 9 MG/DL (ref 5–40)
WBC NRBC COR # BLD AUTO: 4.39 10*3/MM3 (ref 3.4–10.8)

## 2024-09-23 PROCEDURE — 36415 COLL VENOUS BLD VENIPUNCTURE: CPT

## 2024-09-23 PROCEDURE — 84439 ASSAY OF FREE THYROXINE: CPT

## 2024-09-23 PROCEDURE — 82746 ASSAY OF FOLIC ACID SERUM: CPT

## 2024-09-23 PROCEDURE — 82607 VITAMIN B-12: CPT

## 2024-09-23 PROCEDURE — 80061 LIPID PANEL: CPT

## 2024-09-23 PROCEDURE — 80050 GENERAL HEALTH PANEL: CPT

## 2024-09-26 ENCOUNTER — OFFICE VISIT (OUTPATIENT)
Dept: INTERNAL MEDICINE | Age: 59
End: 2024-09-26
Payer: COMMERCIAL

## 2024-09-26 VITALS
OXYGEN SATURATION: 97 % | TEMPERATURE: 98.2 F | BODY MASS INDEX: 28.51 KG/M2 | HEIGHT: 61 IN | HEART RATE: 77 BPM | WEIGHT: 151 LBS

## 2024-09-26 DIAGNOSIS — F41.1 ANXIETY, GENERALIZED: ICD-10-CM

## 2024-09-26 DIAGNOSIS — Z71.3 WEIGHT LOSS COUNSELING, ENCOUNTER FOR: ICD-10-CM

## 2024-09-26 DIAGNOSIS — Z00.00 PHYSICAL EXAM, ANNUAL: Primary | ICD-10-CM

## 2024-09-26 DIAGNOSIS — K21.9 GASTROESOPHAGEAL REFLUX DISEASE WITHOUT ESOPHAGITIS: ICD-10-CM

## 2024-09-26 PROCEDURE — 99396 PREV VISIT EST AGE 40-64: CPT | Performed by: INTERNAL MEDICINE

## 2024-09-26 RX ORDER — IBANDRONATE SODIUM 150 MG/1
150 TABLET, FILM COATED ORAL
Qty: 3 TABLET | Refills: 3 | Status: SHIPPED | OUTPATIENT
Start: 2024-09-26

## 2024-11-11 RX ORDER — ONDANSETRON 4 MG/1
4 TABLET, ORALLY DISINTEGRATING ORAL EVERY 6 HOURS PRN
Qty: 40 TABLET | Refills: 1 | Status: SHIPPED | OUTPATIENT
Start: 2024-11-11

## 2025-01-19 DIAGNOSIS — Z71.3 WEIGHT LOSS COUNSELING, ENCOUNTER FOR: ICD-10-CM

## 2025-01-19 DIAGNOSIS — F33.1 MAJOR DEPRESSIVE DISORDER, RECURRENT, MODERATE: ICD-10-CM

## 2025-01-19 DIAGNOSIS — R63.5 EXCESSIVE WEIGHT GAIN: ICD-10-CM

## 2025-01-19 DIAGNOSIS — F41.1 ANXIETY, GENERALIZED: ICD-10-CM

## 2025-01-19 DIAGNOSIS — Z00.00 PHYSICAL EXAM, ANNUAL: ICD-10-CM

## 2025-01-20 RX ORDER — PANTOPRAZOLE SODIUM 40 MG/1
40 TABLET, DELAYED RELEASE ORAL DAILY
Qty: 90 TABLET | Refills: 3 | Status: SHIPPED | OUTPATIENT
Start: 2025-01-20

## 2025-01-20 RX ORDER — BUPROPION HYDROCHLORIDE 300 MG/1
300 TABLET ORAL DAILY
Qty: 90 TABLET | Refills: 3 | Status: SHIPPED | OUTPATIENT
Start: 2025-01-20

## 2025-03-06 ENCOUNTER — PATIENT MESSAGE (OUTPATIENT)
Dept: INTERNAL MEDICINE | Age: 60
End: 2025-03-06
Payer: COMMERCIAL

## 2025-03-06 ENCOUNTER — TELEPHONE (OUTPATIENT)
Dept: INTERNAL MEDICINE | Age: 60
End: 2025-03-06
Payer: COMMERCIAL

## 2025-03-06 DIAGNOSIS — R82.90 ABNORMAL URINE ODOR: Primary | ICD-10-CM

## 2025-03-06 DIAGNOSIS — R39.89 ABNORMAL URINE COLOR: ICD-10-CM

## 2025-03-07 ENCOUNTER — LAB (OUTPATIENT)
Dept: LAB | Facility: HOSPITAL | Age: 60
End: 2025-03-07
Payer: COMMERCIAL

## 2025-03-07 DIAGNOSIS — R39.89 ABNORMAL URINE COLOR: ICD-10-CM

## 2025-03-07 DIAGNOSIS — R82.90 ABNORMAL URINE ODOR: ICD-10-CM

## 2025-03-07 LAB
BACTERIA UR QL AUTO: ABNORMAL /HPF
BILIRUB UR QL STRIP: NEGATIVE
CLARITY UR: CLEAR
COLOR UR: YELLOW
GLUCOSE UR STRIP-MCNC: NEGATIVE MG/DL
HGB UR QL STRIP.AUTO: NEGATIVE
HYALINE CASTS UR QL AUTO: ABNORMAL /LPF
KETONES UR QL STRIP: NEGATIVE
LEUKOCYTE ESTERASE UR QL STRIP.AUTO: ABNORMAL
NITRITE UR QL STRIP: POSITIVE
PH UR STRIP.AUTO: 6.5 [PH] (ref 5–8)
PROT UR QL STRIP: NEGATIVE
RBC # UR STRIP: ABNORMAL /HPF
REF LAB TEST METHOD: ABNORMAL
SP GR UR STRIP: 1.01 (ref 1–1.03)
SQUAMOUS #/AREA URNS HPF: ABNORMAL /HPF
UROBILINOGEN UR QL STRIP: ABNORMAL
WBC # UR STRIP: ABNORMAL /HPF

## 2025-03-07 PROCEDURE — 87086 URINE CULTURE/COLONY COUNT: CPT

## 2025-03-07 PROCEDURE — 81001 URINALYSIS AUTO W/SCOPE: CPT

## 2025-03-07 PROCEDURE — 87186 SC STD MICRODIL/AGAR DIL: CPT

## 2025-03-07 PROCEDURE — 87088 URINE BACTERIA CULTURE: CPT

## 2025-03-08 ENCOUNTER — TELEPHONE (OUTPATIENT)
Dept: INTERNAL MEDICINE | Age: 60
End: 2025-03-08
Payer: COMMERCIAL

## 2025-03-08 DIAGNOSIS — A49.9 UTI (URINARY TRACT INFECTION), BACTERIAL: Primary | ICD-10-CM

## 2025-03-08 DIAGNOSIS — N39.0 UTI (URINARY TRACT INFECTION), BACTERIAL: Primary | ICD-10-CM

## 2025-03-08 NOTE — TELEPHONE ENCOUNTER
Call patient tell her she does have a slight urinary tract infection will treat with Macrobid, 100 mg twice a day #14 no refills please send that to her pharmacy let her know

## 2025-03-09 LAB — BACTERIA SPEC AEROBE CULT: ABNORMAL

## 2025-03-10 RX ORDER — NITROFURANTOIN 25; 75 MG/1; MG/1
100 CAPSULE ORAL 2 TIMES DAILY
Qty: 14 CAPSULE | Refills: 0 | Status: SHIPPED | OUTPATIENT
Start: 2025-03-10

## 2025-03-10 RX ORDER — CIPROFLOXACIN 500 MG/1
500 TABLET, FILM COATED ORAL 2 TIMES DAILY
Qty: 14 TABLET | Refills: 0 | Status: SHIPPED | OUTPATIENT
Start: 2025-03-10

## 2025-03-25 ENCOUNTER — TELEPHONE (OUTPATIENT)
Dept: INTERNAL MEDICINE | Age: 60
End: 2025-03-25
Payer: COMMERCIAL

## 2025-03-25 DIAGNOSIS — R63.5 EXCESSIVE WEIGHT GAIN: Primary | ICD-10-CM

## 2025-03-27 NOTE — PROGRESS NOTES
Margret Yeager  1965  57 y.o.    Reason for call: Recall  Prep prescribed: Suprep  Prep instructions reviewed with patient and sent to patient via [facility]  Clearance needed? No  If yes, what clearance is needed? N/A  Clearance has been requested from NA  The patient has been scheduled for: Colonoscopy  Family history of colon cancer? 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     Allergies   Allergen Reactions   • Penicillins Unknown - High Severity   • Sulfa Antibiotics Unknown - Low Severity     Past Surgical History:   Procedure Laterality Date   • APPENDECTOMY     •  SECTION  , ,    • CHOLECYSTECTOMY   or    • COLONOSCOPY     • HYSTERECTOMY     • OTHER SURGICAL HISTORY      Joint surgery   • SHOULDER SURGERY     • UPPER GASTROINTESTINAL ENDOSCOPY       Social History     Socioeconomic History   • Marital status:    Tobacco Use   • Smoking status: Never   • Smokeless tobacco: Never   Vaping Use   • Vaping Use: Never used   Substance and Sexual Activity   • Alcohol use: Yes     Comment: Social   • Drug use: Never   • Sexual activity: Yes     Partners: Male     Birth control/protection: Post-menopausal       Current Outpatient Medications:   •  buPROPion XL (Wellbutrin XL) 300 MG 24 hr tablet, Take 1 tablet by mouth Daily., Disp: 90 tablet, Rfl: 3  •  estradiol 1 MG/GM gel, Apply 1 packet topically to the appropriate area as directed Daily., Disp: 30 g, Rfl: 11  •  pantoprazole (Protonix) 40 MG EC tablet, Take 1 tablet by mouth Daily., Disp: 90 tablet, Rfl: 3  •  Semaglutide-Weight Management  Pre-Operative Diagnosis:   Aneurysm of aortic arch and descending thoracic aorta   2. Thoracoabdominal aortic aneurysm with Dissection     Post-Operative Diagnosis:   Aneurysm of aortic arch and descending thoracic aorta   2. Thoracoabdominal aortic aneurysm with Dissection     Procedure Performed:   Left carotid transposition to the left subclavian artery  Ultrasound and access of the bilateral common femoral arteries  Thoracic and abdominal aortogram with pelvic angiogram with radiologic supervision and interpretation  Thoracic endovascular aortic repair with coverage of the innominate and left subclavian artery with placement of modified Cook Alpha endoprosthesis [id number], 37x200 [place] CTAG, [id number] Alpha  Angioplasty and stenting of the left subclavian artery with placement of [id number] [place] side branch endoprosthesis.  Angioplasty and stenting of the innominate artery with placement of [id number] [place] side branch endoprosthesis.  Fenestrated endovascular aortic repair with placement of 4 fenestrations (Celiac, SMA, Right and left renal arteries) with placement of 40 x 32 x205  Zdeg, 36mm [place] Excluder Aortic cuffs placed distally to reinforce device distally.  Celiac 10x38 iCast  SMA 11x39 VBX, 12mm Protege distally  Right renal 7x38 iCast  Left renal 6x79 VBX  Electrosurgical septotomy of dissection CPT 18694  Selective catheterization of SMA bypass graft from right external iliac.  Embolization of left SMA bypass with 12mm Amplatzer plug.  11. Selective catheterization third order branch of the left lower extremity (left internal iliac artery).    12. Angioplasty and stenting of left internal iliac artery with placement of 11 x 79 VBX distally and 11 x 39 VBX proximally postdilated with 14 mm balloon  13. Intravascular ultrasound of SMA, left external iliac, left common iliac, right external iliac, right common iliac and aorta with radiologic supervision and interpretation.  14. Closure of bilateral groin with  0.25 MG/0.5ML solution auto-injector, Inject 0.25 mg under the skin into the appropriate area as directed 1 (One) Time Per Week., Disp: 2 mL, Rfl: 5    Answers for HPI/ROS submitted by the patient on 4/10/2023  Please describe your symptoms.: Time for colonoscopy......also endoscopy since stomach issues  Have you had these symptoms before?: Yes  How long have you been having these symptoms?: Greater than 2 weeks  Please list any medications you are currently taking for this condition.: Just OTC meds  What is the primary reason for your visit?: Other       Pro-glide Perclose suture (20 [place] right, 24 [place] left).     Co-Surgeons:  MD Neil Salgado MD     Due to the complexity of the operation I was asked by my partner to participate as cosurgeon for this operation given the severe medical and surgical complexity.     Surgical Findings: Carotid transposed to the left subclavian.  Endovascular repair performed in the arch with successful exclusion of the aneurysm.   Wide patency of the great vessels with no endoleak.  Neuromonitoring intact with no deficits throughout the entirety of the procedure.  Thermal septotomy performed distally followed by a fenestrated endovascular aortic repair.  Subsequent embolization of SMA bypass performed and repair of left internal iliac dissociated components.  Palpable pulses in the bilateral lower extremities at completion.  Extubated and neurologically intact at completion.     Specimen: None     Estimated Blood Loss: Blood Output: 900 mL       Drains: 10 mm neck drain, lumbar drain     Transfusions: 2 unit packed red blood cells     Complications: none.       Disposition: Awoke from general anesthesia was extubated and taken to the ICU neurovascularly intact.     Indications for procedure: This is a 71 year-old male who was found to have a thoracic aortic aneurysm extending and beginning at the arch with a thoracoabdominal aortic dissection.  He had previous endovascular aortic repair with bilateral iliac branch devices and had growth of this aneurysm in the internal iliac arteries.  There was a dissection flap that he developed after his endovascular aortic repair several years later and he had severe compromise to the true lumen of the graft.  He was also noted to have progressive aneurysmal degeneration in the thoracic aorta to 6.5 cm.  His aneurysm met size criteria for repair.  He had no suitable landing zone in the zone 1 or zone 2 segment.  Given the location of the tears that were in discontinuity he  was not amenable to staging.  He was evaluated  and given the extent of the operation and the redo nature, was deemed to be high risk for open surgery.    We thus recommended a fenestrated repair of the arch with carotid transposition, thermal septotomy followed by fenestrated repair and repair of the left internal iliac artery branch. He was informed that there was no stent commercially available and that he would require a physician modified endograft.  I discussed the risk and benefits of the procedure.  Informed consent was directly obtained.    Patient had lumbar drain placed by neurointerventional radiology the day before.      Details of procedure: On the morning of 03/21 the patient was brought into the operating room and placed in supine position.  General anesthesia was induced without any issues.  An arterial line, Arzola catheter and central line were placed.  Antibiotics were administered.  The patient was subsequently prepped and draped in the usual sterile fashion.  Timeout was performed.     During the anesthesia portion, we modified a Cook alpha endoprosthesis and added to internal retrograde branches that were 9 mm in diameter.  A microwire was used to jennifer the fenestration and this was reinforced with 4-0 Ethibond suture the device was then ready for use.  3-0 chromic suture was used for diameter reducing ties circumferentially and the graft was received into a 20 [place] system.  An additional graft was then modified with 4 fenestrations and sutured microwire rings over the fenestrations with 4 Ethibond suture with circumferential 3-0 chromic sutures for diameter reduction.  Of note the left renal was also reinforced with an external 15 mm x 6 mm in diameter [place] external cuff.     With the use of a 10 blade an 8 cm incision was made in the supraclavicular portion of the base of the neck.  Dissection was carried down to electrocautery through the subcutaneous tissues.  Retractors were placed.   The platysma was transected.  The clavicular head of the sternocleidomastoid was transected with electrocautery.  We first identified the jugular vein and mobilized this laterally to identify the common carotid.  This was circumferentially dissected down to the base under the sternum in order to ensure enough length for transposition.  The vein was then mobilized medially.  The fat pad was mobilized laterally and the anterior skin was identified with identification and preservation of the phrenic nerve.  This was transected with electrocautery.  The subclavian artery was identified and circumferentially dissected and encircled with Vesseloops with preservation of all branches.  The patient was systemically heparinized.  The common carotid was clamped proximally distally and transected with Noguera scissors and tunneled in a retrojugular fashion.  The stump was ligated with the use of 4-0 Prolene suture in running 2 layered fashion. Two large clips were placed to reinforce and radiographically jennifer the stump.      An 11 blade was then used to perform an arteriotomy after clamps were placed proximally and distally on the subclavian.  Lopez scissors were used to carry the arteriotomy.  The artery was trimmed to appropriate length and spatulated and then with 5-0 Prolene suture and end-to-side anastomosis was performed in running fashion. KATHY performed the superior portion and Dr. Yanez performed the inferior portion.  Prior to cinching the sutures the artery was forward and backbled and flushed with heparinized saline and then the sutures were then cinched and tied thereby completing the reconstruction.  The artery was forward bled through the arm and then once this was completed flow was returned to the carotid.  Neuromonitoring was found to be stable throughout the entirety of this segment.  At this point we opted to proceed with the endovascular portion of the procedure.      After the transposition was completed  and I was checking for hemostasis, Dr. Yanez accessed the right common femoral artery with a micropuncture and advanced a microwire and exchanged for a micro sheath.  A J-wire was then put in place followed by placement of a 6 [place] sheath.  He then deployed 2 Pro-glide Perclose sutures in the 10 and 2 o'clock position in each groin with subsequent placement of 8 [place] sheath.  Dr. Yanez performed this in the right. I performed access and preclose technique on the left.        The wire was then advanced into the aorta bilaterally.  Intravascular ultrasound was advanced from the right external iliac, right common iliac into the aorta to confirm placement into the true lumen.  Dr. Yanez performed this on the right. I performed this on the left with confirmation through the left external iliac, left common iliac and aorta.     I then exchanged for a 24 [place] dry seal sheath in the left groin. The graft was advanced into the descending thoracic aorta across the arch with care to manipulate the wire to follow the outer curve thereby aligning the branches with the ostia.  The nose cone was taken just outside the aortic valve with care and deployed without issue.   Dr. Yanez then deployed the graft in the ascending aorta, aortic arch as well as the descending thoracic aorta without any issue.  I then upsized to a 20 [place] sheath in the right groin. I then utilized a Glidewire advantage to select the graft and used the wire then protruded through the innominate branch and with a Sonal 4 catheter the wire was able to select the innominate and directed to the right subclavian and the catheter was advanced and confirmed with contrast injection.  An Amplatz wire was then put in place.  Dr. Yanez then similarly accessed the graft and select the left subclavian branch and directed the wire into the distal left subclavian and confirmed this with contrast injection and placed an Amplatz wire.     A robbi wire  was then put in place and then the diameter reducing ties were fractured with a [place] trilobed balloon. Dr. Yanez then advanced the [place] 20 mm sidebranch over the amplatz wire through the portal into the innominate artery.  He then performed a right upper extremity arteriogram to identify the subclavian and carotid bifurcation off of the innominate.  He then deployed the innominate artery stent performing angioplasty and stenting without any issues.  The angioplasty was performed with a molding and occluding aortic balloon. Similarly, I performed this in the left subclavian artery without issues and deployed a [place] 20 mm sidebranch endoprosthesis and postdilated this with a molding and occluding aortic balloon.  Repeat angiogram revealed wide patency of the stent with preservation of flow to the subclavian and carotid on the right and on the left with no leak through this.       We then proceeded with a thermal septotomy.  Dr. Yanez utilized a 6.5  to exit from the true to the false lumen from the tear in the descending thoracic aorta and was able to externalize the wire into the true lumen and a snare was then used to obtain through and through access.  An [place] wire was then shaped and stripped of the lining and put in place followed by placement of Love cross catheters for insulation.  Thermal septotomy was then performed with Bovie electrocautery.  Intravascular ultrasound was performed simultaneously to confirm successful septotomy.     Dr. Yanez then exchanged for a 37 mm C tag and extended this distal to the fenestration into the descending thoracic aorta. I then deployed a 40 x 36 mid length graft into the the descending thoracic aorta to the level of the celiac artery.  Deployed this without any issue.   And followed this with a 46 x 42 alpha.  I deployed the first 2 grafts without any issues.  I then followed with a 42 x 34 double taper dissection graft all the way to the level of the  celiac artery.     At this point we proceeded with the second fenestrated device.     Dr. Yanez confirmed under fluoroscopy proper orientation of the graft.  Dr. Yanez advanced the stent graft from the left  groin and deployed this without any issues. He unsheathed it in the descending thoracic aorta through the visceral aorta into the infrarenal aorta into the pre-existing graft without issue. He then selected the graft and then advanced the 20 Fr sheath up the right groin into the device.      While the graft was constrained,  Dr. Yanez exchanged for a vanshee 4 catheter and with a glidewire, he was able to select the celiac, confirmed cannulation via contrast angiography with placement of a Krause wire.  He then utilized the vanshee4 with glidewire to select the right renal fenestration confirmed this via contrast angiography. Krause wire was put in place.  I then selected the left renal artery with the  and glide wire and confirmed cannulation with quickcross catheter with contrast injection.  An Amplatz wire was put in place.      We then tried for some time to select the SMA from within the graft however we were able to select this but were entering repeatedly into the false lumen with inability to enter into the true lumen.  As such we opted to abandon this and proceed with a retrograde approach for this.  At this point we decided to deployed the graft fully.  A robbi wire was put in place with MOAB balloon. I then advanced the ballon was then Dr. Yanez inflated the balloon thereby fracturing the diameter reducing and fully deploying the graft.  With the  in place, Dr. Yanez advanced a 6 x 79 VBX through the left renal branch and after some manipulation we were able eventually to get it into appropriate location and performed angioplasty and stenting without any issues and flared the proximal segment with 9 mm balloon.  Repeat angiogram revealed wide patency with no leak  through this.  The wire and catheter were removed.  We then ballooned the entirety of all graft in the proximal segment as well as the overlapping segments.     I then withdrew the sheath partially in the right renal and then Dr. Yanez deployed the right renal stent without issue. This was then post dilated with a 2uba4ro balloon. Contrast injection revealed wide patency without any leak. The sheath and wire were removed. Dr. Yanez then withdrew the sheath partially in the celiac and then he deployed the stent without issue. This was then post dilated with a 18lst6td balloon. Contrast injection revealed wide patency without any leak. The sheath and wire were removed.    We then placed two 36 mm [place] excluder cuffs to reinforce the fenestrated graft into the pre-existing [place] graft and to ensure no leak due to the tortuosity.  This was profiled with molding and occluding aortic balloon.     Dr. Yanez then withdrew the dry seal sheath into the right external iliac and utilized a Kumpe catheter and Glidewire to select the bypass graft that was a retrograde bypass to the SMA that was performed.  He was able to cannulate through the true lumen and confirmed this with intravascular ultrasound of the SMA.  Through an antegrade and retrograde approach via 'Safari technique' we were eventually able to have a wire accessed through the  through the fenestration in an antegrade fashion and was able to advance it into the bypass confirming true lumen cannulation thereby obtaining through and through access. IVUS of SUPERIOR MESENTERIC ARTERY confirmed true lumen cannulation. He then advanced an 11x39 VBX and performed angioplasty and stenting in the SMA without any issues.  We tacked the distal segment with a 12 mm protégé and postdilated this with 8 mm balloon.  Repeat angiogram of this revealed wide patency with no leak through the components.  There is preservation of flow to all branches and confirmed on  IVUS to be into the true lumen.  Dr. Yanez then withdrew the  catheter from below and deployed an Amplatzer plug 12 mm into the bypass thereby excluding this and preventing any competitive flow.     I then used a 16 [place]  from the left groin and was able to place this above the flow divider of the iliac branch endoprosthesis.  I utilized a Glidewire advantage and Love cross catheter to enter through the internal iliac stents and was able to cannulate through the dissociated stents and cannulated the posterior pelvic branches and placed a Krause wire in place.  I then advanced an 11 x 79 VBX and was able to perform angioplasty and stenting into the left internal iliac artery followed by an additional 11 x 39 VBX stent that was profiled with 12 mm balloon proximally inflated to 13/14 mm.  Repeat angiogram of this revealed wide patency without any further dissociation and excellent exclusion of the internal iliac aneurysm.     A thoracic aortogram and abdominal aortogram and pelvic angiogram were performed with the revealed wide patency of the stents with preservation of flow to the great vessels and sealing of the aneurysm with no endoleak with preservation of flow to all visceral vessels.  Intravascular ultrasound from each end confirmed dramatic improvement of flow and excellent graft expansion.  At this point we are satisfied and opted to terminate the procedure.  All catheters were withdrawn.  We then cinched the Pro-glide Perclose sutures in the bilateral groins which were cinched locked and cut thereby closing the arteriotomy site.     Topical hemostatic agents were placed.  Protamine was administered.  Upon completion there was evidence of excellent hemostasis and at this point we proceeded with closure.  I closed the neck incision with the use of 3-0 Vicryl suture in interrupted multilayer fashion.  The skin was closed with 4 Monocryl sutures in subcuticular fashion with placement of Dermabond  and sterile dressings.  Of note he did place a 10 mm Kenneth-Hardy drain into the neck to monitor for chyle leak.     The patient awoke from general anesthesia was extubated neurologically intact and taken to the ICU in stable condition.  All counts were correct at the end of the case. He was confirmed to have palpable pulses in the upper and lower extremities as well and was found to be neurovascularly intact.

## (undated) DEVICE — THE SINGLE USE ETRAP – POLYP TRAP IS USED FOR SUCTION RETRIEVAL OF ENDOSCOPICALLY REMOVED POLYPS.: Brand: ETRAP

## (undated) DEVICE — CONN JET HYDRA H20 AUXILIARY DISP

## (undated) DEVICE — Device: Brand: DEFENDO AIR/WATER/SUCTION AND BIOPSY VALVE

## (undated) DEVICE — BLCK/BITE BLOX WO/DENTL/RIM W/STRAP 54F

## (undated) DEVICE — SNAR POLYP CAPTIFLEX XS/OVL 11X2.4MM 240CM 1P/U

## (undated) DEVICE — SOL IRRG H2O PL/BG 1000ML STRL

## (undated) DEVICE — LINER SURG CANSTR SXN S/RIGD 1500CC

## (undated) DEVICE — SOLIDIFIER LIQLOC PLS 1500CC BT

## (undated) DEVICE — Device

## (undated) DEVICE — SINGLE-USE BIOPSY FORCEPS: Brand: RADIAL JAW 4